# Patient Record
Sex: MALE | Race: WHITE | NOT HISPANIC OR LATINO
[De-identification: names, ages, dates, MRNs, and addresses within clinical notes are randomized per-mention and may not be internally consistent; named-entity substitution may affect disease eponyms.]

---

## 2018-04-15 ENCOUNTER — TRANSCRIPTION ENCOUNTER (OUTPATIENT)
Age: 23
End: 2018-04-15

## 2019-08-19 PROBLEM — Z00.00 ENCOUNTER FOR PREVENTIVE HEALTH EXAMINATION: Status: ACTIVE | Noted: 2019-08-19

## 2019-08-20 ENCOUNTER — APPOINTMENT (OUTPATIENT)
Dept: ULTRASOUND IMAGING | Facility: CLINIC | Age: 24
End: 2019-08-20
Payer: COMMERCIAL

## 2019-08-20 ENCOUNTER — APPOINTMENT (OUTPATIENT)
Dept: RADIOLOGY | Facility: CLINIC | Age: 24
End: 2019-08-20
Payer: COMMERCIAL

## 2019-08-20 ENCOUNTER — OUTPATIENT (OUTPATIENT)
Dept: OUTPATIENT SERVICES | Facility: HOSPITAL | Age: 24
LOS: 1 days | End: 2019-08-20
Payer: COMMERCIAL

## 2019-08-20 DIAGNOSIS — Z00.8 ENCOUNTER FOR OTHER GENERAL EXAMINATION: ICD-10-CM

## 2019-08-20 PROCEDURE — 76770 US EXAM ABDO BACK WALL COMP: CPT | Mod: 26

## 2019-08-20 PROCEDURE — 74018 RADEX ABDOMEN 1 VIEW: CPT

## 2019-08-20 PROCEDURE — 74018 RADEX ABDOMEN 1 VIEW: CPT | Mod: 26

## 2019-08-20 PROCEDURE — 76770 US EXAM ABDO BACK WALL COMP: CPT

## 2020-12-31 ENCOUNTER — TRANSCRIPTION ENCOUNTER (OUTPATIENT)
Age: 25
End: 2020-12-31

## 2021-02-24 ENCOUNTER — TRANSCRIPTION ENCOUNTER (OUTPATIENT)
Age: 26
End: 2021-02-24

## 2021-07-08 ENCOUNTER — APPOINTMENT (OUTPATIENT)
Dept: RADIOLOGY | Facility: CLINIC | Age: 26
End: 2021-07-08
Payer: COMMERCIAL

## 2021-07-08 ENCOUNTER — OUTPATIENT (OUTPATIENT)
Dept: OUTPATIENT SERVICES | Facility: HOSPITAL | Age: 26
LOS: 1 days | End: 2021-07-08
Payer: COMMERCIAL

## 2021-07-08 DIAGNOSIS — Z00.8 ENCOUNTER FOR OTHER GENERAL EXAMINATION: ICD-10-CM

## 2021-07-08 PROCEDURE — 72070 X-RAY EXAM THORAC SPINE 2VWS: CPT | Mod: 26

## 2021-07-08 PROCEDURE — 72100 X-RAY EXAM L-S SPINE 2/3 VWS: CPT | Mod: 26

## 2021-07-08 PROCEDURE — 72070 X-RAY EXAM THORAC SPINE 2VWS: CPT

## 2021-07-08 PROCEDURE — 72040 X-RAY EXAM NECK SPINE 2-3 VW: CPT | Mod: 26

## 2021-07-08 PROCEDURE — 72040 X-RAY EXAM NECK SPINE 2-3 VW: CPT

## 2021-07-08 PROCEDURE — 72100 X-RAY EXAM L-S SPINE 2/3 VWS: CPT

## 2021-09-09 ENCOUNTER — TRANSCRIPTION ENCOUNTER (OUTPATIENT)
Age: 26
End: 2021-09-09

## 2021-11-29 ENCOUNTER — TRANSCRIPTION ENCOUNTER (OUTPATIENT)
Age: 26
End: 2021-11-29

## 2021-11-29 ENCOUNTER — APPOINTMENT (OUTPATIENT)
Dept: PSYCHIATRY | Facility: CLINIC | Age: 26
End: 2021-11-29
Payer: COMMERCIAL

## 2021-11-29 PROCEDURE — 90791 PSYCH DIAGNOSTIC EVALUATION: CPT | Mod: 95

## 2021-12-01 ENCOUNTER — TRANSCRIPTION ENCOUNTER (OUTPATIENT)
Age: 26
End: 2021-12-01

## 2021-12-13 ENCOUNTER — APPOINTMENT (OUTPATIENT)
Dept: PSYCHIATRY | Facility: CLINIC | Age: 26
End: 2021-12-13
Payer: COMMERCIAL

## 2021-12-13 PROCEDURE — ZZZZZ: CPT

## 2022-01-10 ENCOUNTER — APPOINTMENT (OUTPATIENT)
Dept: PSYCHIATRY | Facility: CLINIC | Age: 27
End: 2022-01-10
Payer: COMMERCIAL

## 2022-01-10 PROCEDURE — 96136 PSYCL/NRPSYC TST PHY/QHP 1ST: CPT | Mod: 95

## 2022-01-10 PROCEDURE — 96132 NRPSYC TST EVAL PHYS/QHP 1ST: CPT | Mod: 95

## 2022-01-10 PROCEDURE — 96137 PSYCL/NRPSYC TST PHY/QHP EA: CPT | Mod: 95

## 2022-01-10 PROCEDURE — 96133 NRPSYC TST EVAL PHYS/QHP EA: CPT | Mod: 95

## 2022-02-11 ENCOUNTER — APPOINTMENT (OUTPATIENT)
Dept: PSYCHIATRY | Facility: CLINIC | Age: 27
End: 2022-02-11

## 2022-03-14 ENCOUNTER — EMERGENCY (EMERGENCY)
Facility: HOSPITAL | Age: 27
LOS: 1 days | Discharge: HOME | End: 2022-03-14
Attending: EMERGENCY MEDICINE | Admitting: EMERGENCY MEDICINE
Payer: COMMERCIAL

## 2022-03-14 VITALS
RESPIRATION RATE: 15 BRPM | OXYGEN SATURATION: 95 % | HEIGHT: 73 IN | TEMPERATURE: 98 F | DIASTOLIC BLOOD PRESSURE: 77 MMHG | WEIGHT: 209.66 LBS | HEART RATE: 82 BPM | SYSTOLIC BLOOD PRESSURE: 116 MMHG

## 2022-03-14 PROCEDURE — 10060 I&D ABSCESS SIMPLE/SINGLE: CPT

## 2022-03-14 PROCEDURE — 99283 EMERGENCY DEPT VISIT LOW MDM: CPT | Mod: 25

## 2022-03-14 RX ADMIN — Medication 100 MILLIGRAM(S): at 22:43

## 2022-03-14 NOTE — ED PROVIDER NOTE - CARE PROVIDER_API CALL
Glenn Bauman (DPM)  Podiatric Medicine and Surgery  [address] [place] [address]  Phone: [phone]  Fax: [phone]  Follow Up Time: 4-6 Days

## 2022-03-14 NOTE — ED PROVIDER NOTE - OBJECTIVE STATEMENT
left 1st toe swollen/red/painful, cuts nails down as far as possible, has had ingrown nails, never removed before, no fever/chills, wears shoes without socks generally left 1st toe swollen/red/painful, cuts nails down as far as possible - last time 3d ago, has had ingrown nails in the past, never removed before, no fever/chills, wears shoes without socks generally

## 2022-03-14 NOTE — ED PROVIDER NOTE - PATIENT PORTAL LINK FT
You can access the FollowMyHealth Patient Portal offered by [facility] by registering at the following website: [email or web address]. By joining [facility]’s FollowMyHealth portal, you will also be able to view your health information using other applications (apps) compatible with our system.

## 2022-03-20 ENCOUNTER — TRANSCRIPTION ENCOUNTER (OUTPATIENT)
Age: 27
End: 2022-03-20

## 2022-05-23 ENCOUNTER — NON-APPOINTMENT (OUTPATIENT)
Age: 27
End: 2022-05-23

## 2022-07-14 ENCOUNTER — NON-APPOINTMENT (OUTPATIENT)
Age: 27
End: 2022-07-14

## 2022-09-02 ENCOUNTER — APPOINTMENT (OUTPATIENT)
Dept: PULMONOLOGY | Facility: CLINIC | Age: 27
End: 2022-09-02

## 2022-09-02 ENCOUNTER — LABORATORY RESULT (OUTPATIENT)
Age: 27
End: 2022-09-02

## 2022-09-02 VITALS
BODY MASS INDEX: 26.51 KG/M2 | RESPIRATION RATE: 16 BRPM | HEART RATE: 70 BPM | OXYGEN SATURATION: 97 % | DIASTOLIC BLOOD PRESSURE: 65 MMHG | HEIGHT: 73 IN | SYSTOLIC BLOOD PRESSURE: 110 MMHG | TEMPERATURE: 97.6 F | WEIGHT: 200 LBS

## 2022-09-02 DIAGNOSIS — Z86.59 PERSONAL HISTORY OF OTHER MENTAL AND BEHAVIORAL DISORDERS: ICD-10-CM

## 2022-09-02 DIAGNOSIS — Z82.69 FAMILY HISTORY OF OTHER DISEASES OF THE MUSCULOSKELETAL SYSTEM AND CONNECTIVE TISSUE: ICD-10-CM

## 2022-09-02 DIAGNOSIS — U07.1 COVID-19: ICD-10-CM

## 2022-09-02 DIAGNOSIS — Z82.5 FAMILY HISTORY OF ASTHMA AND OTHER CHRONIC LOWER RESPIRATORY DISEASES: ICD-10-CM

## 2022-09-02 DIAGNOSIS — Z78.9 OTHER SPECIFIED HEALTH STATUS: ICD-10-CM

## 2022-09-02 DIAGNOSIS — Z87.09 PERSONAL HISTORY OF OTHER DISEASES OF THE RESPIRATORY SYSTEM: ICD-10-CM

## 2022-09-02 DIAGNOSIS — Z88.9 ALLERGY STATUS TO UNSPECIFIED DRUGS, MEDICAMENTS AND BIOLOGICAL SUBSTANCES: ICD-10-CM

## 2022-09-02 LAB
25(OH)D3 SERPL-MCNC: 40.1 NG/ML
A1AT SERPL-MCNC: 123 MG/DL
BASOPHILS # BLD AUTO: 0.04 K/UL
BASOPHILS NFR BLD AUTO: 0.7 %
EOSINOPHIL # BLD AUTO: 0.37 K/UL
EOSINOPHIL NFR BLD AUTO: 6.8 %
HCT VFR BLD CALC: 48.2 %
HGB BLD-MCNC: 16 G/DL
IMM GRANULOCYTES NFR BLD AUTO: 0.2 %
LYMPHOCYTES # BLD AUTO: 2.4 K/UL
LYMPHOCYTES NFR BLD AUTO: 44 %
MAN DIFF?: NORMAL
MCHC RBC-ENTMCNC: 30.4 PG
MCHC RBC-ENTMCNC: 33.2 GM/DL
MCV RBC AUTO: 91.6 FL
MONOCYTES # BLD AUTO: 0.4 K/UL
MONOCYTES NFR BLD AUTO: 7.3 %
NEUTROPHILS # BLD AUTO: 2.24 K/UL
NEUTROPHILS NFR BLD AUTO: 41 %
PLATELET # BLD AUTO: 257 K/UL
RBC # BLD: 5.26 M/UL
RBC # FLD: 12.1 %
WBC # FLD AUTO: 5.46 K/UL

## 2022-09-02 PROCEDURE — 94010 BREATHING CAPACITY TEST: CPT

## 2022-09-02 PROCEDURE — 94727 GAS DIL/WSHOT DETER LNG VOL: CPT

## 2022-09-02 PROCEDURE — 95012 NITRIC OXIDE EXP GAS DETER: CPT

## 2022-09-02 PROCEDURE — 94618 PULMONARY STRESS TESTING: CPT

## 2022-09-02 PROCEDURE — 94729 DIFFUSING CAPACITY: CPT

## 2022-09-02 PROCEDURE — 71046 X-RAY EXAM CHEST 2 VIEWS: CPT

## 2022-09-02 PROCEDURE — 99204 OFFICE O/P NEW MOD 45 MIN: CPT | Mod: 25

## 2022-09-02 RX ORDER — EPINEPHRINE 0.3MG/0.3
0.3 AUTO-INJECTOR (EA) INJECTION
Refills: 0 | Status: ACTIVE | COMMUNITY

## 2022-09-02 RX ORDER — LEVOCETIRIZINE DIHYDROCHLORIDE 5 MG/1
5 TABLET ORAL
Refills: 0 | Status: ACTIVE | COMMUNITY

## 2022-09-02 RX ORDER — VENLAFAXINE HCL 50 MG
TABLET ORAL
Refills: 0 | Status: ACTIVE | COMMUNITY

## 2022-09-02 NOTE — REASON FOR VISIT
[Initial] : an initial visit [TextBox_44] : SOB, mild and intermittent asthma, allergies / urticaria, ?OSAS

## 2022-09-02 NOTE — ASSESSMENT
[FreeTextEntry1] : Mr. ESCAMILLA  is a 26 year old male with a history of OCD, depression, asthma, allergies, urticaria, s/p COVID-19 6/2022 who now comes to the office for an initial pulmonary evaluation for SOB, mild and intermittent asthma, allergies, urticaria, ?OSAS \par \par His shortness of breath is multifactorial due to:\par -poor mechanics of breathing \par -out of shape / over weight \par - pulmonary disease\par    - mild and intermittent asthma \par -?cardiac disease (doubt)\par \par problem 1: Mild and intermittent asthma\par -Add Symbicort 160 2 inhalations BID \par -Add Ventolin 2 puffs Q6H, pre-exercise \par - blood work: alpha-1 antitrypsin level\par  - Asthma is believed to be caused by inherited (genetic) and environmental factor, but its exact cause is unknown. Asthma may be triggered by allergens, lung infections, or irritants in the air. Asthma triggers are different for each person.\par -Inhaler technique reviewed as well as oral hygiene techniques reviewed with patient. Avoidance of cold air, extremes of temperature, rescue inhaler should be used before exercise. Order of medication reviewed with patient. Recommended use of a cool mist humidifier in the bedroom.\par \par \par problem 2: Allergies / Urticaria \par -add Olopatadine 0.6% 1 sniff BID \par -continue Xyzal 5 mg QHS \par - gets allergy shots \par - script given for blood work: asthma profile, food IgE panel, eosinophil level, IgE level, Vitamin D level\par - Environmental measures for allergies were encouraged including mattress and pillow cover, air purifier, and environmental controls.\par \par \par Problem 3: ?OSAS (risk factors: snoring, neck size 16 inches, high mallampati class) \par - get HSS \par -Sleep apnea is associated with adverse clinical consequences which can affect most organ systems.  Cardiovascular disease risk includes arrhythmias, atrial fibrillation, hypertension, coronary artery disease, and stroke. Metabolic disorders include diabetes type 2, non-alcoholic fatty liver disease. Mood disorder especially depression; and cognitive decline especially in the elderly. Associations with  chronic reflux/Bennett’s esophagus some but not all inclusive. \par -Reasons  include arousal consistent with hypopnea; respiratory events most prominent in REM sleep or supine position; therefore sleep staging and body position are important for accurate diagnosis and estimation of AHI.\par \par \par problem 4 : cardiac disease\par -recommended to continue to follow up with Cardiologist \par \par problem 5: poor breathing mechanics\par -recommended Wim Hof and Buteyko breathing techniques \par -Proper breathing techniques were reviewed with an emphasis of exhalation. Patient instructed to breath in for 1 second and out for four seconds. Patient was encouraged to not talk while walking. \par \par problem 6 :  overweight/ out of shape \par - recommended 150 min of exercise a week \par - Recommended "10-Day Detox" by Joe Linn for 2-3 weeks followed by probiotics \par -Weight loss, exercise, and diet control were discussed and are highly encouraged. Treatment options are given such as, aqua therapy, and contacting a nutritionist. Recommended to use the elliptical, stationary bike, less use of treadmill. \par \par Problem 7: s/p COVID-19 6/2022 \par - s/p COVID-19 vaccine x3  \par \par problem 8: health maintenance \par -recommended yearly flu shot after October 15\par -recommended strep pneumonia vaccines: Prevnar-13 vaccine, followed by Pneumo vaccine 23 one year following after 65 years old. \par -recommended early intervention for Upper Respiratory Infections (URIs)\par -recommended regular osteoporosis evaluations\par -recommended early dermatological evaluations\par -recommended after the age of 50 to the age of 70, colonoscopy every 5 years\par \par F/U in 6-8 weeks.\par He is encouraged to call with any changes, concerns, or questions\par
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
## 2022-09-02 NOTE — PROCEDURE
[FreeTextEntry1] : FENO was 10; normal value being less than 25\par Fractional exhaled nitric oxide (FENO) is regarded as a simple, noninvasive method for assessing eosinophilic airway inflammation. Produced by a variety of cells within the lung, nitric oxide (NO) concentrations are generally low in healthy individuals. However, high concentrations of NO appear to be involved in nonspecific host defense mechanisms and chronic inflammatory diseases such as asthma. The American Thoracic Society (ATS) therefore has recommended using FENO to aid in the diagnosis and monitoring of eosinophilic airway inflammation and asthma, and for identifying steroid responsive individuals whose chronic respiratory symptoms may be airway inflammation. \par \par 6 minute walk test reveals a low saturation of 97% with no evidence of dyspnea or fatigue; walked  489.0 meters \par \par CXR reveals normal sized heart; relative vol difference in lungs, right lung has lessvol than left ; mild scoliosis to the right \par \par FULL PFTs reveals very mild obstructive flows; FEV1 was  4.50L which is 90% of predicted; mid to low lung volumes; normal diffusion of 26.9, which is 78% of predicted; poor inspiratory limb

## 2022-09-02 NOTE — ADDENDUM
[FreeTextEntry1] : Documented by Uzma Grande acting as a scribe for Dr. Shawn Alves on 09/02/2022 \par \par All medical record entries made by the Scribe were at my, Dr. Shawn Alves's, direction and personally dictated by me on 09/02/2022 . I have reviewed the chart and agree that the record accurately reflects my personal performance of the history, physical exam, assessment and plan. I have also personally directed, reviewed, and agree with the discharge instructions.

## 2022-09-03 LAB — 24R-OH-CALCIDIOL SERPL-MCNC: 44.6 PG/ML

## 2022-09-05 ENCOUNTER — NON-APPOINTMENT (OUTPATIENT)
Age: 27
End: 2022-09-05

## 2022-09-06 LAB
A1AT PHENOTYP SERPL-IMP: NORMAL
A1AT SERPL-MCNC: 123 MG/DL

## 2022-09-07 ENCOUNTER — NON-APPOINTMENT (OUTPATIENT)
Age: 27
End: 2022-09-07

## 2022-09-07 LAB
DEPRECATED GOOSE FEATHER IGE RAST QL: 0
GOOSE FEATHER IGE QN: <0.1 KUA/L
TOTAL IGE SMQN RAST: 124 KU/L

## 2022-09-08 LAB
A ALTERNATA IGE QN: <0.1 KUA/L
A ALTERNATA IGE QN: <0.1 KUA/L
A FUMIGATUS IGE QN: <0.1 KUA/L
A FUMIGATUS IGE QN: <0.1 KUA/L
BERMUDA GRASS IGE QN: <0.1 KUA/L
BOXELDER IGE QN: 0.26 KUA/L
C ALBICANS IGE QN: <0.1 KUA/L
C HERBARUM IGE QN: <0.1 KUA/L
C HERBARUM IGE QN: <0.1 KUA/L
CALIF WALNUT IGE QN: 0.26 KUA/L
CAT DANDER IGE QN: 0.39 KUA/L
CAT DANDER IGE QN: 0.39 KUA/L
CLAM IGE QN: <0.1 KUA/L
CMN PIGWEED IGE QN: <0.1 KUA/L
CODFISH IGE QN: <0.1 KUA/L
COMMON RAGWEED IGE QN: 0.12 KUA/L
COMMON RAGWEED IGE QN: 0.12 KUA/L
CORN IGE QN: <0.1 KUA/L
COTTONWOOD IGE QN: 0.1 KUA/L
COW MILK IGE QN: <0.1 KUA/L
D FARINAE IGE QN: 10.1 KUA/L
D FARINAE IGE QN: 10.1 KUA/L
D PTERONYSS IGE QN: 5.04 KUA/L
D PTERONYSS IGE QN: 5.04 KUA/L
DEPRECATED A ALTERNATA IGE RAST QL: 0
DEPRECATED A ALTERNATA IGE RAST QL: 0
DEPRECATED A FUMIGATUS IGE RAST QL: 0
DEPRECATED A FUMIGATUS IGE RAST QL: 0
DEPRECATED BERMUDA GRASS IGE RAST QL: 0
DEPRECATED BOXELDER IGE RAST QL: NORMAL
DEPRECATED C ALBICANS IGE RAST QL: 0
DEPRECATED C HERBARUM IGE RAST QL: 0
DEPRECATED C HERBARUM IGE RAST QL: 0
DEPRECATED CAT DANDER IGE RAST QL: 1
DEPRECATED CAT DANDER IGE RAST QL: 1
DEPRECATED CLAM IGE RAST QL: 0
DEPRECATED CODFISH IGE RAST QL: 0
DEPRECATED COMMON PIGWEED IGE RAST QL: 0
DEPRECATED COMMON RAGWEED IGE RAST QL: NORMAL
DEPRECATED COMMON RAGWEED IGE RAST QL: NORMAL
DEPRECATED CORN IGE RAST QL: 0
DEPRECATED COTTONWOOD IGE RAST QL: NORMAL
DEPRECATED COW MILK IGE RAST QL: 0
DEPRECATED D FARINAE IGE RAST QL: 3
DEPRECATED D FARINAE IGE RAST QL: 3
DEPRECATED D PTERONYSS IGE RAST QL: 3
DEPRECATED D PTERONYSS IGE RAST QL: 3
DEPRECATED DOG DANDER IGE RAST QL: 2
DEPRECATED DOG DANDER IGE RAST QL: 2
DEPRECATED DUCK FEATHER IGE RAST QL: 0
DEPRECATED EGG WHITE IGE RAST QL: 0
DEPRECATED GOOSEFOOT IGE RAST QL: 0
DEPRECATED LONDON PLANE IGE RAST QL: NORMAL
DEPRECATED M RACEMOSUS IGE RAST QL: 0
DEPRECATED MOUSE URINE PROT IGE RAST QL: 0
DEPRECATED MUGWORT IGE RAST QL: 0
DEPRECATED P NOTATUM IGE RAST QL: 0
DEPRECATED PEANUT IGE RAST QL: NORMAL
DEPRECATED RED CEDAR IGE RAST QL: NORMAL
DEPRECATED ROACH IGE RAST QL: NORMAL
DEPRECATED ROACH IGE RAST QL: NORMAL
DEPRECATED SCALLOP IGE RAST QL: <0.1 KUA/L
DEPRECATED SESAME SEED IGE RAST QL: NORMAL
DEPRECATED SHEEP SORREL IGE RAST QL: 0
DEPRECATED SHRIMP IGE RAST QL: 0
DEPRECATED SILVER BIRCH IGE RAST QL: 2
DEPRECATED SOYBEAN IGE RAST QL: 0
DEPRECATED TIMOTHY IGE RAST QL: 2
DEPRECATED TIMOTHY IGE RAST QL: 2
DEPRECATED WALNUT IGE RAST QL: 0
DEPRECATED WHEAT IGE RAST QL: NORMAL
DEPRECATED WHITE ASH IGE RAST QL: 1
DEPRECATED WHITE OAK IGE RAST QL: 3
DEPRECATED WHITE OAK IGE RAST QL: 3
DOG DANDER IGE QN: 1.44 KUA/L
DOG DANDER IGE QN: 1.44 KUA/L
DUCK FEATHER IGE QN: <0.1 KUA/L
EGG WHITE IGE QN: <0.1 KUA/L
GOOSEFOOT IGE QN: <0.1 KUA/L
LONDON PLANE IGE QN: 0.11 KUA/L
M RACEMOSUS IGE QN: <0.1 KUA/L
MOUSE URINE PROT IGE QN: <0.1 KUA/L
MUGWORT IGE QN: <0.1 KUA/L
MULBERRY (T70) CLASS: 0
MULBERRY (T70) CONC: <0.1 KUA/L
P NOTATUM IGE QN: <0.1 KUA/L
PEANUT IGE QN: 0.18 KUA/L
RED CEDAR IGE QN: 0.11 KUA/L
ROACH IGE QN: 0.12 KUA/L
ROACH IGE QN: 0.12 KUA/L
SCALLOP IGE QN: 0
SCALLOP IGE QN: <0.1 KUA/L
SESAME SEED IGE QN: 0.25 KUA/L
SHEEP SORREL IGE QN: <0.1 KUA/L
SILVER BIRCH IGE QN: 1.11 KUA/L
SOYBEAN IGE QN: <0.1 KUA/L
TIMOTHY IGE QN: 0.91 KUA/L
TIMOTHY IGE QN: 0.91 KUA/L
TREE ALLERG MIX1 IGE QL: NORMAL
WALNUT IGE QN: <0.1 KUA/L
WHEAT IGE QN: 0.14 KUA/L
WHITE ASH IGE QN: 0.35 KUA/L
WHITE ELM IGE QN: 0.26 KUA/L
WHITE ELM IGE QN: NORMAL
WHITE OAK IGE QN: 6.69 KUA/L
WHITE OAK IGE QN: 6.69 KUA/L

## 2022-09-09 LAB
ANNOTATION COMMENT IMP: NORMAL
ELECTRONIC SIGNATURE: NORMAL
SERPINA1 GENE MUT TESTED BLD/T: NORMAL

## 2022-09-11 ENCOUNTER — EMERGENCY (EMERGENCY)
Facility: HOSPITAL | Age: 27
LOS: 1 days | Discharge: HOME | End: 2022-09-11
Attending: EMERGENCY MEDICINE | Admitting: EMERGENCY MEDICINE
Payer: COMMERCIAL

## 2022-09-11 VITALS
OXYGEN SATURATION: 98 % | HEIGHT: 73 IN | SYSTOLIC BLOOD PRESSURE: 130 MMHG | TEMPERATURE: 98 F | RESPIRATION RATE: 15 BRPM | HEART RATE: 91 BPM | WEIGHT: 196.21 LBS | DIASTOLIC BLOOD PRESSURE: 89 MMHG

## 2022-09-11 PROCEDURE — 99284 EMERGENCY DEPT VISIT MOD MDM: CPT

## 2022-09-11 PROCEDURE — 99282 EMERGENCY DEPT VISIT SF MDM: CPT

## 2022-09-11 NOTE — ED PROVIDER NOTE - OBJECTIVE STATEMENT
Patient complaining of dog bite to right forearm and left shin from a known dog.  Patient relates the dog belongs to his friend's girlfriend, he is not sure of the dog's rabies vaccine status however he will be contacting his friend to find out, will contact animal control as needed.  Patient's tetanus up-to-date.  Patient is currently on a course of Augmentin for an infection to his finger. no fevers chills or nay other complaints.  pmd - [facility]

## 2022-09-11 NOTE — ED PROVIDER NOTE - CARE PROVIDER_API CALL
Mehrdad Duenas; MBBS)  Internal Medicine  [address] [place] [address]  Phone: [phone]  Fax: [phone]  Follow Up Time: 1-3 Days

## 2022-09-11 NOTE — ED PROVIDER NOTE - NSFOLLOWUPINSTRUCTIONS_ED_ALL_ED_FT
continue augmentin as previously  follow up with animal control and dog's owner to assess dog vaccine status, and return for rabies vaccination if needed per animal control

## 2022-09-11 NOTE — ED PROVIDER NOTE - PATIENT PORTAL LINK FT
You can access the FollowMyHealth Patient Portal offered by [facility] by registering at the following website: [email or web address]. By joining [facility]’s FollowMyHealth portal, you will also be able to view your health information using other applications (apps) compatible with our system.

## 2022-09-11 NOTE — ED PROVIDER NOTE - CLINICAL SUMMARY MEDICAL DECISION MAKING FREE TEXT BOX
Patient complaining of dog bite to right forearm and left shin from a known dog.  Patient relates the dog belongs to his friend's girlfriend, patient is not sure of the dog's rabies vaccine status however he will be contacting owner to find out, will contact animal control as needed.  Patient's tetanus up-to-date.  Patient is currently on a course of Augmentin for an infection to his finger.  Plan wound care, outpatient follow-up

## 2022-09-11 NOTE — ED ADULT NURSE NOTE - OBJECTIVE STATEMENT
patient likes to check his wound, he was bitten by a dog with unknown rabies shot yesterday, patient is already on antibiotics, denies fever or chills, n/v, MD at bedside, will continue to monitor.

## 2022-09-11 NOTE — ED PROVIDER NOTE - SKIN, MLM
Skin normal color for race, warm, dry. abrasions right forearm and left shin at bite sites, no bleeding no erythema

## 2022-10-20 ENCOUNTER — APPOINTMENT (OUTPATIENT)
Dept: SLEEP CENTER | Facility: CLINIC | Age: 27
End: 2022-10-20

## 2022-10-20 ENCOUNTER — FORM ENCOUNTER (OUTPATIENT)
Age: 27
End: 2022-10-20

## 2022-10-20 PROCEDURE — ZZZZZ: CPT

## 2022-10-21 ENCOUNTER — APPOINTMENT (OUTPATIENT)
Dept: SLEEP CENTER | Facility: CLINIC | Age: 27
End: 2022-10-21

## 2022-10-21 ENCOUNTER — OUTPATIENT (OUTPATIENT)
Dept: OUTPATIENT SERVICES | Facility: HOSPITAL | Age: 27
LOS: 1 days | End: 2022-10-21
Payer: COMMERCIAL

## 2022-10-21 PROCEDURE — 95806 SLEEP STUDY UNATT&RESP EFFT: CPT | Mod: 26

## 2022-10-21 PROCEDURE — 95806 SLEEP STUDY UNATT&RESP EFFT: CPT

## 2022-10-26 ENCOUNTER — NON-APPOINTMENT (OUTPATIENT)
Age: 27
End: 2022-10-26

## 2022-11-01 ENCOUNTER — APPOINTMENT (OUTPATIENT)
Dept: SURGERY | Facility: CLINIC | Age: 27
End: 2022-11-01

## 2022-11-01 ENCOUNTER — NON-APPOINTMENT (OUTPATIENT)
Age: 27
End: 2022-11-01

## 2022-11-01 VITALS
WEIGHT: 197 LBS | DIASTOLIC BLOOD PRESSURE: 70 MMHG | HEART RATE: 90 BPM | SYSTOLIC BLOOD PRESSURE: 110 MMHG | HEIGHT: 73 IN | TEMPERATURE: 97.4 F | OXYGEN SATURATION: 96 % | BODY MASS INDEX: 26.11 KG/M2

## 2022-11-01 PROCEDURE — 99203 OFFICE O/P NEW LOW 30 MIN: CPT

## 2022-11-01 RX ORDER — ALBUTEROL SULFATE 90 UG/1
108 (90 BASE) AEROSOL, METERED RESPIRATORY (INHALATION)
Refills: 0 | Status: DISCONTINUED | COMMUNITY
End: 2022-11-01

## 2022-11-01 RX ORDER — OLOPATADINE HYDROCHLORIDE 665 UG/1
0.6 SPRAY, METERED NASAL
Qty: 1 | Refills: 3 | Status: DISCONTINUED | COMMUNITY
Start: 2022-09-02 | End: 2022-11-01

## 2022-11-02 ENCOUNTER — OUTPATIENT (OUTPATIENT)
Dept: OUTPATIENT SERVICES | Facility: HOSPITAL | Age: 27
LOS: 1 days | End: 2022-11-02

## 2022-11-02 DIAGNOSIS — Z00.8 ENCOUNTER FOR OTHER GENERAL EXAMINATION: ICD-10-CM

## 2022-11-03 NOTE — PLAN
[FreeTextEntry1] : Persistent but minimally symptomatic soft tissue lesion of skin/ SQ of right forearm.\par History suggestive of cystic lesion/ collection.  Exam today benign.\par Reassurance provided for the present.\par Small size goes against aspiration and no fluctuance to mandate drainage.\par Recommending observation and local care for present.\par Daily warm water and soap wash with H202 rinse.\par Will arrange for dedicated soft tissue ultrasound to further delineate anatomy and rule out concerning pathology.\par If progresses or more symptomatic, then elective excision can be discussed further; R/B/N reviewed preliminarily.\par Mr. Wiley is pleased, his mother agrees, they leave in good spirits.  I await sonography report.\par \par Please note that more than 50% of face to face time was spent in counseling and coordination of care.

## 2022-11-03 NOTE — HISTORY OF PRESENT ILLNESS
[de-identified] : Very pleasant though admittedly anxious young man presenting to the office today in the company of his mother.\par They describe a visible/ palpable discrete lesion or small inflammatory collection/ cyst/ lesion along the posterior aspect of his right arm since ~ July.\par Mr. Wiley believes that it may be secondary to "an insect bite."\par However, he denies kyle discomfort/ pain or any odor/ drainage.\par He admits that his main concern is that "I don't know what it is... and it hasn't really gone away... sometimes it is better... sometimes it is worse..."

## 2022-11-03 NOTE — REVIEW OF SYSTEMS
[As Noted in HPI] : as noted in HPI [Anxiety] : anxiety [Negative] : Heme/Lymph [Feeling Poorly] : not feeling poorly [Feeling Tired] : not feeling tired [Depression] : no depression [de-identified] : regarding this issue and visit...

## 2022-11-03 NOTE — PHYSICAL EXAM
[Respiratory Effort] : normal respiratory effort [Normal Rate and Rhythm] : normal rate and rhythm [No HSM] : no hepatosplenomegaly [Alert] : alert [Oriented to Person] : oriented to person [Oriented to Place] : oriented to place [Oriented to Time] : oriented to time [Anxious] : anxious [Tender] : was nontender [Enlarged] : not enlarged [de-identified] : Appears well, no acute distress, ambulates easily into office and assumes examination table without need of assistance.  [de-identified] : Normocephalic and atraumatic.  [de-identified] : Supple with full range of motion.  [FreeTextEntry1] : No cervical, supraclavicular or axillary adenopathy.  [de-identified] : Deferred.  [de-identified] : Soft and non tense and non tender. [de-identified] : Deferred.  [de-identified] : Deferred.  [de-identified] : Grossly symmetric and within normal limits without motor or sensory deficits.  [de-identified] : Soft, rubbery, semi-mobile soft tissue lesion ~ 1 cm in size in deep dermal layer or superficial SQ of dorsal aspect of proximal right forearm.  No visible cellulitis.  No palpable fluctuance.  Non tender to manipulation today. [de-identified] : though appropriately so...

## 2022-11-03 NOTE — REASON FOR VISIT
[Initial Evaluation] : an initial evaluation [FreeTextEntry1] : regarding "a bump on my right forearm..."

## 2022-11-04 ENCOUNTER — NON-APPOINTMENT (OUTPATIENT)
Age: 27
End: 2022-11-04

## 2022-11-04 ENCOUNTER — APPOINTMENT (OUTPATIENT)
Dept: PULMONOLOGY | Facility: CLINIC | Age: 27
End: 2022-11-04

## 2022-11-04 VITALS
HEIGHT: 73 IN | HEART RATE: 80 BPM | BODY MASS INDEX: 25.84 KG/M2 | SYSTOLIC BLOOD PRESSURE: 124 MMHG | RESPIRATION RATE: 16 BRPM | WEIGHT: 195 LBS | DIASTOLIC BLOOD PRESSURE: 68 MMHG | TEMPERATURE: 97.3 F | OXYGEN SATURATION: 97 %

## 2022-11-04 DIAGNOSIS — Z92.29 PERSONAL HISTORY OF OTHER DRUG THERAPY: ICD-10-CM

## 2022-11-04 PROCEDURE — 94010 BREATHING CAPACITY TEST: CPT

## 2022-11-04 PROCEDURE — 95012 NITRIC OXIDE EXP GAS DETER: CPT

## 2022-11-04 PROCEDURE — 99214 OFFICE O/P EST MOD 30 MIN: CPT | Mod: 25

## 2022-11-04 NOTE — REASON FOR VISIT
[Follow-Up] : a follow-up visit [TextBox_44] : SOB, mild and intermittent asthma, allergies / urticaria

## 2022-11-04 NOTE — ADDENDUM
[FreeTextEntry1] : Documented by Fede Figueroa acting as a scribe for Dr. Shawn Alves on (11/04/2022).\par \par All medical record entries made by the Scribe were at my, Dr. Shawn Alves's, direction and personally dictated by me on (11/04/2022). I have reviewed the chart and agree that the record accurately reflects my personal performance of the history, physical exam, assessment and plan. I have personally directed, reviewed, and agree with the discharge instructions.

## 2022-11-04 NOTE — HISTORY OF PRESENT ILLNESS
[TextBox_4] : Mr. ESCAMILLA is a 26 year old male presenting to the office today for follow up pulmonary evaluation. His chief complaint is\par - he notes feeling well in general \par - he denies difficulty swallowing\par - he notes not getting great sleep, 6-7 hours, not completely restful\par - he notes bowels are regular \par - he denies taking any new medications, vitamins, or supplements \par - he notes walking \par \par - He denies any headaches, nausea, vomiting, fever, chills, sweats, chest pain, chest pressure, palpitations, SOB, coughing, wheezing, fatigue, diarrhea, constipation, dysphagia, myalgias, dizziness, leg swelling, leg pain, itchy eyes, itchy ears, heartburn, reflux, or sour taste in the mouth

## 2022-11-04 NOTE — PROCEDURE
[FreeTextEntry1] : PFT reveals normal flows, with an FEV1 of  4.72L, which is 97% of predicted, with a faint inspiratory limb \par \par FENO was 8; a normal value being less than 25\par Fractional exhaled nitric oxide (FENO) is regarded as a simple, noninvasive method for assessing eosinophilic airway inflammation. Produced by a variety of cells within the lung, nitric oxide (NO) concentrations are generally low in healthy individuals. However, high concentrations of NO appear to be involved in nonspecific host defense mechanisms and chronic inflammatory diseases such as asthma. The American Thoracic Society (ATS) therefore has recommended using FENO to aid in the diagnosis and monitoring of eosinophilic airway inflammation and asthma, and for identifying steroid responsive individuals whose chronic respiratory symptoms may be caused by airway inflammation. \par \par Sleep study (10/21/2022) revealed sleep apnea with an AHI/RONI of 1.7

## 2022-11-04 NOTE — ASSESSMENT
[FreeTextEntry1] : Mr. ESCAMILLA  is a 26 year old male with a history of OCD, depression, asthma, allergies, urticaria, s/p COVID-19 6/2022 who now comes to the office for a follow up pulmonary evaluation for SOB, mild and intermittent asthma, allergies, urticaria, elevated IgE/eosinophils \par \par His shortness of breath is multifactorial due to:\par -poor mechanics of breathing \par -out of shape / over weight \par - pulmonary disease\par    - mild and intermittent asthma \par -?cardiac disease (doubt)\par \par problem 1: Mild and intermittent asthma\par -Symbicort 160 2 inhalations BID \par -Ventolin 2 puffs Q6H, pre-exercise \par - s/p blood work: alpha-1 antitrypsin level WNL\par  - Asthma is believed to be caused by inherited (genetic) and environmental factor, but its exact cause is unknown. Asthma may be triggered by allergens, lung infections, or irritants in the air. Asthma triggers are different for each person.\par -Inhaler technique reviewed as well as oral hygiene techniques reviewed with patient. Avoidance of cold air, extremes of temperature, rescue inhaler should be used before exercise. Order of medication reviewed with patient. Recommended use of a cool mist humidifier in the bedroom.\par \par problem 1A: Eleavted Ige\par -Xolair is a recombinant DNA- derived humanized IgG1K monoclonal antibody that selectively binds ot human immunoglobulin E (IgE). Xolair is produced by a Chinese hamster ovary cell suspension culture in nutrient medium containing the antibiotic gentamicin. Gentamicin is not detectable in the final product. Xolair is a sterile, white, preservative free, lyophilized powder contained in a single use vial that is reconstituted with sterile water for suspension. Side effects include: wheezing, tightness of the chest, trouble breathing, hives, skin rash, feeling anxious or light-headed, fainting, warmth or tingling under skin, or swelling of face, lips, or tongue \par \par problem 1B: Eosinophilic asthma\par - The safety and efficacy of Nucala was established in three double-blind, randomized, placebo-controlled trials in patients with severe asthma. Compared to a placebo, patients with severe asthma receiving Nucala had fewer exacerbation requiring hospitalization and/or emergency department visits, and a longer time to first exacerbation. In addition, patients with severe asthma receiving Nucala or Fasenra experienced greater reductions in their daily maintenance oral corticosteroid dose, while maintaining asthma control compared with patients receiving placebo. Treatment with Nucala did not result in a significant improvement in lung function, as measured by the volume of air exhaled by patients in one second. The most common side effects include: headache, injection site reactions, back pain, weakness, and fatigue; hypersensitivity reactions can occur within hours or days including swelling of the face, mouth, and tongue, fainting, dizziness, hives, breathing problems, and rash; herpes zoster infections have occurred. The drug is a monoclonal antibody that inhibits interleukin-5 which helps regular eosinophils, a type of white blood cell that contributes to asthma. The over-production of eosinophils can cause inflammation in the lungs, increasing the frequency of asthma attacks. Patients must also take other medications, including high dose inhaled corticosteroids and at least one additional asthma drug. \par \par problem 2: Allergies / Urticaria \par -add Olopatadine 0.6% 1 sniff BID \par -continue Xyzal 5 mg QHS \par - gets allergy shots \par -s/p  script given for blood work:+ asthma profile, + food IgE panel, + eosinophil level,+ IgE level, Vitamin D level normal \par - Environmental measures for allergies were encouraged including mattress and pillow cover, air purifier, and environmental controls.\par \par \par Problem 3: ?OSAS (risk factors: snoring, neck size 16 inches, high mallampati class) \par - s/p HSS Not present \par -Sleep apnea is associated with adverse clinical consequences which can affect most organ systems.  Cardiovascular disease risk includes arrhythmias, atrial fibrillation, hypertension, coronary artery disease, and stroke. Metabolic disorders include diabetes type 2, non-alcoholic fatty liver disease. Mood disorder especially depression; and cognitive decline especially in the elderly. Associations with  chronic reflux/Bennett’s esophagus some but not all inclusive. \par -Reasons  include arousal consistent with hypopnea; respiratory events most prominent in REM sleep or supine position; therefore sleep staging and body position are important for accurate diagnosis and estimation of AHI.\par \par \par problem 4 : cardiac disease (doubtful) \par -recommended to continue to follow up with Cardiologist \par \par problem 5: poor breathing mechanics\par -recommended Rachel Jorgensenko breathing techniques \par -Proper breathing techniques were reviewed with an emphasis of exhalation. Patient instructed to breath in for 1 second and out for four seconds. Patient was encouraged to not talk while walking. \par \par problem 6 :  overweight/ out of shape \par - recommended 150 min of exercise a week \par - Recommended "10-Day Detox" by Joe Linn for 2-3 weeks followed by probiotics \par -Weight loss, exercise, and diet control were discussed and are highly encouraged. Treatment options are given such as, aqua therapy, and contacting a nutritionist. Recommended to use the elliptical, stationary bike, less use of treadmill. \par \par Problem 7: s/p COVID-19 6/2022 \par - s/p COVID-19 vaccine x3  \par \par problem 8: health maintenance \par -recommended yearly flu shot after October 15 2022\par -recommended strep pneumonia vaccines: Prevnar-13 vaccine, followed by Pneumo vaccine 23 one year following after 65 years old. \par -recommended early intervention for Upper Respiratory Infections (URIs)\par -recommended regular osteoporosis evaluations\par -recommended early dermatological evaluations\par -recommended after the age of 50 to the age of 70, colonoscopy every 5 years\par \par F/U in 6-8 weeks.\par He is encouraged to call with any changes, concerns, or questions\par

## 2022-11-09 ENCOUNTER — APPOINTMENT (OUTPATIENT)
Dept: ULTRASOUND IMAGING | Facility: CLINIC | Age: 27
End: 2022-11-09

## 2022-11-09 ENCOUNTER — NON-APPOINTMENT (OUTPATIENT)
Age: 27
End: 2022-11-09

## 2022-11-09 ENCOUNTER — OUTPATIENT (OUTPATIENT)
Dept: OUTPATIENT SERVICES | Facility: HOSPITAL | Age: 27
LOS: 1 days | End: 2022-11-09
Payer: COMMERCIAL

## 2022-11-09 DIAGNOSIS — Z00.8 ENCOUNTER FOR OTHER GENERAL EXAMINATION: ICD-10-CM

## 2022-11-09 DIAGNOSIS — L72.9 FOLLICULAR CYST OF THE SKIN AND SUBCUTANEOUS TISSUE, UNSPECIFIED: ICD-10-CM

## 2022-11-09 PROCEDURE — 76882 US LMTD JT/FCL EVL NVASC XTR: CPT | Mod: 26,RT

## 2022-11-09 PROCEDURE — 76882 US LMTD JT/FCL EVL NVASC XTR: CPT

## 2022-11-16 ENCOUNTER — APPOINTMENT (OUTPATIENT)
Dept: SURGERY | Facility: CLINIC | Age: 27
End: 2022-11-16

## 2022-11-16 VITALS
HEART RATE: 73 BPM | TEMPERATURE: 97.2 F | DIASTOLIC BLOOD PRESSURE: 70 MMHG | WEIGHT: 195 LBS | OXYGEN SATURATION: 97 % | HEIGHT: 73 IN | BODY MASS INDEX: 25.84 KG/M2 | SYSTOLIC BLOOD PRESSURE: 110 MMHG

## 2022-11-16 DIAGNOSIS — D48.7 NEOPLASM OF UNCERTAIN BEHAVIOR OF OTHER SPECIFIED SITES: ICD-10-CM

## 2022-11-16 DIAGNOSIS — L72.9 FOLLICULAR CYST OF THE SKIN AND SUBCUTANEOUS TISSUE, UNSPECIFIED: ICD-10-CM

## 2022-11-16 PROCEDURE — 99215 OFFICE O/P EST HI 40 MIN: CPT

## 2022-11-16 NOTE — REASON FOR VISIT
[Follow-Up: _____] : a [unfilled] follow-up visit [FreeTextEntry1] : regarding "a bump on my right forearm..."

## 2022-11-16 NOTE — PHYSICAL EXAM
[Respiratory Effort] : normal respiratory effort [Normal Rate and Rhythm] : normal rate and rhythm [No HSM] : no hepatosplenomegaly [Tender] : was nontender [Enlarged] : not enlarged [Alert] : alert [Oriented to Person] : oriented to person [Oriented to Place] : oriented to place [Oriented to Time] : oriented to time [Anxious] : anxious [de-identified] : Appears well, no acute distress, ambulates easily into the office.  [de-identified] : Remains normocephalic and atraumatic.  [de-identified] : Still supple with full range of motion.  [FreeTextEntry1] : No palpable cervical, supraclavicular or axillary adenopathy.  [de-identified] : Deferred.  [de-identified] : Soft, non tense and non tender. [de-identified] : Deferred.  [de-identified] : Deferred.  [de-identified] : Grossly symmetric, within normal limits without motor or sensory deficit.  [de-identified] : Soft, rubbery, semi-mobile soft tissue lesion ~ 1 cm in size in deep dermal layer or superficial SQ of dorsal aspect of proximal right forearm.  No visible cellulitis.  No palpable fluctuance.  Non tender to manipulation today. [de-identified] : though quite appropriately so...

## 2022-11-16 NOTE — PLAN
[FreeTextEntry1] : Minimally symptomatic, but persistent soft tissue lesion SQ of right forearm- separate from skin per dedicated soft tissue ultrasound but above underlying fascial tissue planes.\par History suggestive of benign cystic lesion/ collection.  \par Exam today with slight improvement, though palpable lesion present.\par Reassurance provided for the present without indication for immediate aspiration or drainage.\par Possible ongoing observation with watchful waiting discussed.\par However, both Mr. Escamilla and his mother are eager to proceed with definitive management and diagnosis with excision in light of the unknown or unclear underlying pathology as well as the patient’s understandable anxiety.  \par \par The risks, benefits and nature of the operative intervention have been reviewed with Mr. ESCAMILLA at length, including but not limited to the approximate location and size of the expected or typical operative incision, the probability of a visible post operative “scar” or local skin changes and the possibilities of bleeding, infection, seroma, lymphedema or future lesion recurrence.   \par \par Mr. ESCAMILLA  is aware that future recommendations or procedures may be required pending the operative findings and the final Pathology report.  \par \par Mr. ESCAMILLA  demonstrates good insight and remains eager to proceed.  While there are further specific concerns presently, I have encouraged Mr. ESCAMILLA  to follow-up with me and my office at any time in the interim to discuss any future questions.  \par \par Our plan will be for an elective outpatient procedure following any standard pre-surgical testing or medical clearance as required by the appropriate facility.\par \par Please note that more than 50% of face to face time was spent in counseling and coordination of care..

## 2022-11-16 NOTE — REVIEW OF SYSTEMS
[Feeling Poorly] : not feeling poorly [Feeling Tired] : not feeling tired [As Noted in HPI] : as noted in HPI [Anxiety] : anxiety [Depression] : no depression [Negative] : Heme/Lymph [de-identified] : regarding this issue and visit, though somewhat less so overall...

## 2022-11-16 NOTE — HISTORY OF PRESENT ILLNESS
[de-identified] : Very pleasant though admittedly anxious young man presenting to the office today in the company of his mother.\par They describe a visible/ palpable discrete lesion or small inflammatory collection/ cyst/ lesion along the posterior aspect of his right arm since ~ July.\par Mr. Wiley believes that it may be secondary to "an insect bite."\par However, he denies kyle discomfort/ pain or any odor/ drainage.\par He admits that his main concern is that "I don't know what it is... and it hasn't really gone away... sometimes it is better... sometimes it is worse..." [de-identified] : Very pleasant though anxious young man returning to the office in the company of his mother.\par They describe a visible/ palpable discrete lesion along the posterior aspect of his right arm since ~ July.\par Mr. Wiley denies kyle discomfort/ pain or odor/ drainage.\par He and his mother state that it is "improved since last visit, but not all gone."\par He reports that his main concern remains that "I don't know what it is... and it hasn't really gone away... sometimes it is better... sometimes it is worse... I don't want to deal with this again in the future..."

## 2022-11-16 NOTE — DATA REVIEWED
[FreeTextEntry1] : EXAM: [id number] - US NONVASC EXT LTD RT  - ORDERED BY: DOMENIC PEREZ\par \par PROCEDURE DATE:  11/09/2022  \par  \par INTERPRETATION:  Clinical indication palpable abnormality dorsum of \par forearm status post antibiotics\par \par Targeted ultrasound right forearm was performed.\par \par FINDINGS:\par In the subcutaneous tissues just below the dermal surface is a small 0.9 \par x 0.3 x 0.9 cm complex cystic area which does not extend into the \par underlying musculature. No definitive communication to the skin can be \par seen.. No foreign body identified.\par \par IMPRESSION:\par Small subdermal 0.9 x 0.3 x 0.9 cm heterogeneous fluid seen without deep \par extension or communicating to the skin\par \par --- End of Report ---\par \par JUDY PINTO MD; Attending Radiologist \par This document has been electronically signed. Nov 9 2022 11:33AM

## 2022-11-29 DIAGNOSIS — G47.33 OBSTRUCTIVE SLEEP APNEA (ADULT) (PEDIATRIC): ICD-10-CM

## 2022-11-30 ENCOUNTER — APPOINTMENT (OUTPATIENT)
Dept: SURGERY | Facility: HOSPITAL | Age: 27
End: 2022-11-30

## 2022-12-16 ENCOUNTER — APPOINTMENT (OUTPATIENT)
Dept: SURGERY | Facility: CLINIC | Age: 27
End: 2022-12-16

## 2023-03-10 ENCOUNTER — NON-APPOINTMENT (OUTPATIENT)
Age: 28
End: 2023-03-10

## 2023-03-10 ENCOUNTER — APPOINTMENT (OUTPATIENT)
Dept: PULMONOLOGY | Facility: CLINIC | Age: 28
End: 2023-03-10
Payer: COMMERCIAL

## 2023-03-10 VITALS
SYSTOLIC BLOOD PRESSURE: 110 MMHG | HEIGHT: 72 IN | RESPIRATION RATE: 16 BRPM | DIASTOLIC BLOOD PRESSURE: 80 MMHG | WEIGHT: 195 LBS | TEMPERATURE: 97.3 F | BODY MASS INDEX: 26.41 KG/M2

## 2023-03-10 VITALS — OXYGEN SATURATION: 98 % | HEART RATE: 70 BPM

## 2023-03-10 PROCEDURE — 99214 OFFICE O/P EST MOD 30 MIN: CPT | Mod: 25

## 2023-03-10 PROCEDURE — 95012 NITRIC OXIDE EXP GAS DETER: CPT

## 2023-03-10 PROCEDURE — 94010 BREATHING CAPACITY TEST: CPT

## 2023-03-10 RX ORDER — ALBUTEROL SULFATE 2.5 MG/3ML
(2.5 MG/3ML) SOLUTION RESPIRATORY (INHALATION)
Qty: 120 | Refills: 5 | Status: ACTIVE | COMMUNITY
Start: 2023-03-10 | End: 1900-01-01

## 2023-03-10 NOTE — ADDENDUM
[FreeTextEntry1] : Documented by Fede Figueroa acting as a scribe for Dr. Shawn Alves on (03/10/2023).\par \par All medical record entries made by the Scribe were at my, Dr. Shawn Alves's, direction and personally dictated by me on (03/10/2023). I have reviewed the chart and agree that the record accurately reflects my personal performance of the history, physical exam, assessment and plan. I have personally directed, reviewed, and agree with the discharge instructions.

## 2023-03-10 NOTE — PROCEDURE
[FreeTextEntry1] : FENO was 11; a normal value being less than 25\par Fractional exhaled nitric oxide (FENO) is regarded as a simple, noninvasive method for assessing eosinophilic airway inflammation. Produced by a variety of cells within the lung, nitric oxide (NO) concentrations are generally low in healthy individuals. However, high concentrations of NO appear to be involved in nonspecific host defense mechanisms and chronic inflammatory diseases such as asthma. The American Thoracic Society (ATS) therefore has recommended using FENO to aid in the diagnosis and monitoring of eosinophilic airway inflammation and asthma, and for identifying steroid responsive individuals whose chronic respiratory symptoms may be caused by airway inflammation. \par \par PFT reveals normal flows, with an FEV1 of  4.91L, which is 102% of predicted, with a normal flow volume loop

## 2023-03-10 NOTE — PHYSICAL EXAM

## 2023-03-10 NOTE — ASSESSMENT
[FreeTextEntry1] : Mr. ESCAMILLA  is a 27 year old male with a history of OCD, depression, asthma, allergies, urticaria, s/p COVID-19 6/2022 who now comes to the office for a follow up pulmonary evaluation for SOB, mild and intermittent asthma, allergies, urticaria, elevated IgE/eosinophils- active asthma (3/2023) s/p URI\par \par His shortness of breath is multifactorial due to:\par -poor mechanics of breathing \par -out of shape / over weight \par - pulmonary disease\par    - mild and intermittent asthma \par -?cardiac disease (doubt)\par \par problem 1: Mild and intermittent asthma (Active) \par -Symbicort 160 2 inhalations BID \par -Ventolin 2 puffs Q6H, pre-exercise \par - s/p blood work: alpha-1 antitrypsin level WNL\par - Nebulizer Albuterol/ Budesonide  0.5 mg BID  \par  - Asthma is believed to be caused by inherited (genetic) and environmental factor, but its exact cause is unknown. Asthma may be triggered by allergens, lung infections, or irritants in the air. Asthma triggers are different for each person.\par -Inhaler technique reviewed as well as oral hygiene techniques reviewed with patient. Avoidance of cold air, extremes of temperature, rescue inhaler should be used before exercise. Order of medication reviewed with patient. Recommended use of a cool mist humidifier in the bedroom.\par \par problem 1A: Elevated Ige\par -Xolair is a recombinant DNA- derived humanized IgG1K monoclonal antibody that selectively binds ot human immunoglobulin E (IgE). Xolair is produced by a Chinese hamster ovary cell suspension culture in nutrient medium containing the antibiotic gentamicin. Gentamicin is not detectable in the final product. Xolair is a sterile, white, preservative free, lyophilized powder contained in a single use vial that is reconstituted with sterile water for suspension. Side effects include: wheezing, tightness of the chest, trouble breathing, hives, skin rash, feeling anxious or light-headed, fainting, warmth or tingling under skin, or swelling of face, lips, or tongue \par \par problem 1B: Eosinophilic asthma\par - The safety and efficacy of Nucala was established in three double-blind, randomized, placebo-controlled trials in patients with severe asthma. Compared to a placebo, patients with severe asthma receiving Nucala had fewer exacerbation requiring hospitalization and/or emergency department visits, and a longer time to first exacerbation. In addition, patients with severe asthma receiving Nucala or Fasenra experienced greater reductions in their daily maintenance oral corticosteroid dose, while maintaining asthma control compared with patients receiving placebo. Treatment with Nucala did not result in a significant improvement in lung function, as measured by the volume of air exhaled by patients in one second. The most common side effects include: headache, injection site reactions, back pain, weakness, and fatigue; hypersensitivity reactions can occur within hours or days including swelling of the face, mouth, and tongue, fainting, dizziness, hives, breathing problems, and rash; herpes zoster infections have occurred. The drug is a monoclonal antibody that inhibits interleukin-5 which helps regular eosinophils, a type of white blood cell that contributes to asthma. The over-production of eosinophils can cause inflammation in the lungs, increasing the frequency of asthma attacks. Patients must also take other medications, including high dose inhaled corticosteroids and at least one additional asthma drug. \par \par problem 2: Allergies / Urticaria \par -add Olopatadine 0.6% 1 sniff BID \par -continue Xyzal 5 mg QHS \par - gets allergy shots \par -s/p  script given for blood work:+ asthma profile, + food IgE panel, + eosinophil level,+ IgE level, Vitamin D level normal \par - Environmental measures for allergies were encouraged including mattress and pillow cover, air purifier, and environmental controls.\par \par \par Problem 3: ?OSAS (risk factors: snoring, neck size 16 inches, high mallampati class) \par - s/p HSS Not present \par -Sleep apnea is associated with adverse clinical consequences which can affect most organ systems.  Cardiovascular disease risk includes arrhythmias, atrial fibrillation, hypertension, coronary artery disease, and stroke. Metabolic disorders include diabetes type 2, non-alcoholic fatty liver disease. Mood disorder especially depression; and cognitive decline especially in the elderly. Associations with  chronic reflux/Bennett’s esophagus some but not all inclusive. \par -Reasons  include arousal consistent with hypopnea; respiratory events most prominent in REM sleep or supine position; therefore sleep staging and body position are important for accurate diagnosis and estimation of AHI.\par \par \par problem 4 : cardiac disease (doubtful) \par -recommended to continue to follow up with Cardiologist \par \par problem 5: poor breathing mechanics\par -recommended Wifrances Hancock and Omayra breathing techniques \par -Proper breathing techniques were reviewed with an emphasis of exhalation. Patient instructed to breath in for 1 second and out for four seconds. Patient was encouraged to not talk while walking. \par \par problem 6 :  overweight/ out of shape \par - recommended 150 min of exercise a week \par - Recommended "10-Day Detox" by Joe Linn for 2-3 weeks followed by probiotics \par -Weight loss, exercise, and diet control were discussed and are highly encouraged. Treatment options are given such as, aqua therapy, and contacting a nutritionist. Recommended to use the elliptical, stationary bike, less use of treadmill. \par \par Problem 7: s/p COVID-19 6/2022 \par - s/p COVID-19 vaccine x3  \par \par problem 8: health maintenance \par -recommended yearly flu shot after October 15 2022\par -recommended strep pneumonia vaccines: Prevnar-13 vaccine, followed by Pneumo vaccine 23 one year following after 65 years old. \par -recommended early intervention for Upper Respiratory Infections (URIs)\par -recommended regular osteoporosis evaluations\par -recommended early dermatological evaluations\par -recommended after the age of 50 to the age of 70, colonoscopy every 5 years\par \par F/U in 6-8 weeks.\par He is encouraged to call with any changes, concerns, or questions\par

## 2023-03-10 NOTE — HISTORY OF PRESENT ILLNESS
[TextBox_4] : Mr. ESCAMILLA is a 27 year old male presenting to the office today for follow up pulmonary evaluation. His chief complaint is\par - s/p cold about a month ago and is currently wheezing throughout the day \par - he notes brining up mucus \par - he notes sinus are congested\par - he notes PND \par - he notes sleep could be better \par - he notes work is busy\par - he notes work anxiety \par - he notes urticaria has subsided \par \par - He  denies any headaches, nausea, vomiting, fever, chills, sweats, chest pains, chest pressure, diarrhea, constipation, dysphagia, myalgia, dizziness, leg swelling, leg pain, itchy eyes, itchy ears, heartburn, reflux, or sour taste in the mouth.

## 2023-03-23 ENCOUNTER — APPOINTMENT (OUTPATIENT)
Dept: PULMONOLOGY | Facility: CLINIC | Age: 28
End: 2023-03-23
Payer: COMMERCIAL

## 2023-03-23 VITALS
TEMPERATURE: 98 F | SYSTOLIC BLOOD PRESSURE: 110 MMHG | BODY MASS INDEX: 26.41 KG/M2 | HEART RATE: 81 BPM | DIASTOLIC BLOOD PRESSURE: 70 MMHG | OXYGEN SATURATION: 98 % | RESPIRATION RATE: 16 BRPM | WEIGHT: 195 LBS | HEIGHT: 72 IN

## 2023-03-23 DIAGNOSIS — J82.83 EOSINOPHILIC ASTHMA: ICD-10-CM

## 2023-03-23 DIAGNOSIS — J45.909 UNSPECIFIED ASTHMA, UNCOMPLICATED: ICD-10-CM

## 2023-03-23 DIAGNOSIS — U07.1 COVID-19: ICD-10-CM

## 2023-03-23 DIAGNOSIS — R76.8 OTHER SPECIFIED ABNORMAL IMMUNOLOGICAL FINDINGS IN SERUM: ICD-10-CM

## 2023-03-23 DIAGNOSIS — E66.3 OVERWEIGHT: ICD-10-CM

## 2023-03-23 DIAGNOSIS — T78.40XA ALLERGY, UNSPECIFIED, INITIAL ENCOUNTER: ICD-10-CM

## 2023-03-23 DIAGNOSIS — R06.83 SNORING: ICD-10-CM

## 2023-03-23 DIAGNOSIS — L50.9 URTICARIA, UNSPECIFIED: ICD-10-CM

## 2023-03-23 PROCEDURE — 99214 OFFICE O/P EST MOD 30 MIN: CPT

## 2023-03-23 RX ORDER — BUDESONIDE AND FORMOTEROL FUMARATE DIHYDRATE 160; 4.5 UG/1; UG/1
160-4.5 AEROSOL RESPIRATORY (INHALATION) TWICE DAILY
Qty: 3 | Refills: 1 | Status: DISCONTINUED | COMMUNITY
Start: 2022-09-02 | End: 2023-03-23

## 2023-03-23 RX ORDER — BUDESONIDE 0.5 MG/2ML
0.5 INHALANT ORAL TWICE DAILY
Qty: 60 | Refills: 3 | Status: DISCONTINUED | COMMUNITY
Start: 2023-03-10 | End: 2023-03-23

## 2023-03-23 NOTE — PHYSICAL EXAM
[No Acute Distress] : no acute distress [Normal Oropharynx] : normal oropharynx [Normal Appearance] : normal appearance [No Neck Mass] : no neck mass [Normal Rate/Rhythm] : normal rate/rhythm [Normal S1, S2] : normal s1, s2 [No Murmurs] : no murmurs [No Resp Distress] : no resp distress [Clear to Auscultation Bilaterally] : clear to auscultation bilaterally [No Abnormalities] : no abnormalities [Kyphosis] : kyphosis [Benign] : benign [Normal Gait] : normal gait [No Clubbing] : no clubbing [No Cyanosis] : no cyanosis [No Edema] : no edema [FROM] : FROM [Normal Color/ Pigmentation] : normal color/ pigmentation [No Focal Deficits] : no focal deficits [Oriented x3] : oriented x3 [Normal Affect] : normal affect [II] : Mallampati Class: II [TextBox_68] : I:E 1:3, clear

## 2023-03-23 NOTE — HISTORY OF PRESENT ILLNESS
[TextBox_4] : Mr. ESCAMILLA is a 27 year old male presenting to the office today for follow up pulmonary evaluation. His chief complaint is \par - he is now seeing a psychiatrist and he was on Effexor. 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 \par - he notes physically he feels okay but mentally he's not doing so well. \par - he notes sleep is still very shaky. He has had inconsistent sleeping durations, some nights he sleeps better than others. \par - he tries to shut off his phone by 11pm. \par - bowels are regular\par - allergies controlled right now \par - he's no longer getting urticaria\par -He denies any visual issues, headaches, nausea, vomiting, fever, chills, sweats, chest pains, chest pressure, diarrhea, constipation, dysphagia, myalgia, dizziness, leg swelling, leg pain, itchy eyes, itchy ears, heartburn, reflux, or sour taste in the mouth.\par

## 2023-03-23 NOTE — ADDENDUM
[FreeTextEntry1] : Documented by Uzma Grande acting as a scribe for Dr. Shawn Alves on 03/23/2023 \par \par All medical record entries made by the Scribe were at my, Dr. Shawn Alves's, direction and personally dictated by me on 03/23/2023 . I have reviewed the chart and agree that the record accurately reflects my personal performance of the history, physical exam, assessment and plan. I have also personally directed, reviewed, and agree with the discharge instructions.

## 2023-03-23 NOTE — ASSESSMENT
[FreeTextEntry1] : Mr. ESCAMILLA  is a 27 year old male with a history of OCD, depression, asthma, allergies, urticaria, s/p COVID-19 6/2022 who now comes to the office for a follow up pulmonary evaluation for SOB, mild and intermittent asthma, allergies, urticaria, elevated IgE/eosinophils- s/p active asthma (3/2023) s/p URI (resolved) - but steroid psychosis \par \par His shortness of breath is multifactorial due to:\par -poor mechanics of breathing \par -out of shape / over weight \par - pulmonary disease\par    - mild and intermittent asthma \par -?cardiac disease (doubt)\par \par problem 1: Mild and intermittent asthma (Active) \par -(Unable) Symbicort 160 2 inhalations BID \par -(Unable) Nebulizer Albuterol/ Budesonide  0.5 mg BID  \par - Add Zyflo 120 BID \par - Add Stiolto 2 puffs QD \par -Ventolin 2 puffs Q6H, pre-exercise \par - s/p blood work: alpha-1 antitrypsin level WNL\par  - Asthma is believed to be caused by inherited (genetic) and environmental factor, but its exact cause is unknown. Asthma may be triggered by allergens, lung infections, or irritants in the air. Asthma triggers are different for each person.\par -Inhaler technique reviewed as well as oral hygiene techniques reviewed with patient. Avoidance of cold air, extremes of temperature, rescue inhaler should be used before exercise. Order of medication reviewed with patient. Recommended use of a cool mist humidifier in the bedroom.\par \par problem 1A: Elevated Ige\par - Xolair Candidate \par -Xolair is a recombinant DNA- derived humanized IgG1K monoclonal antibody that selectively binds ot human immunoglobulin E (IgE). Xolair is produced by a Chinese hamster ovary cell suspension culture in nutrient medium containing the antibiotic gentamicin. Gentamicin is not detectable in the final product. Xolair is a sterile, white, preservative free, lyophilized powder contained in a single use vial that is reconstituted with sterile water for suspension. Side effects include: wheezing, tightness of the chest, trouble breathing, hives, skin rash, feeling anxious or light-headed, fainting, warmth or tingling under skin, or swelling of face, lips, or tongue \par \par problem 1B: Eosinophilic asthma\par - Candidate for Nucala / Fasenra / Dupixent \par - The safety and efficacy of Nucala was established in three double-blind, randomized, placebo-controlled trials in patients with severe asthma. Compared to a placebo, patients with severe asthma receiving Nucala had fewer exacerbation requiring hospitalization and/or emergency department visits, and a longer time to first exacerbation. In addition, patients with severe asthma receiving Nucala or Fasenra experienced greater reductions in their daily maintenance oral corticosteroid dose, while maintaining asthma control compared with patients receiving placebo. Treatment with Nucala did not result in a significant improvement in lung function, as measured by the volume of air exhaled by patients in one second. The most common side effects include: headache, injection site reactions, back pain, weakness, and fatigue; hypersensitivity reactions can occur within hours or days including swelling of the face, mouth, and tongue, fainting, dizziness, hives, breathing problems, and rash; herpes zoster infections have occurred. The drug is a monoclonal antibody that inhibits interleukin-5 which helps regular eosinophils, a type of white blood cell that contributes to asthma. The over-production of eosinophils can cause inflammation in the lungs, increasing the frequency of asthma attacks. Patients must also take other medications, including high dose inhaled corticosteroids and at least one additional asthma drug. \par Dupixent is a prescription medicine used with other asthma medicines for the maintenance treatment of moderate-to-severe asthma in people aged 12 years and older whose asthma is not controlled with their current asthma medicines. Dupixent helps prevent severe asthma attacks (exacerbations) and can improve your breathing. Dupixent may also help reduce the amount of oral corticosteroids you need while preventing severe asthma attacks and improving your breathing. Dupixent is not used to treat sudden breathing problems. Risks and side effect of Dupixent were discussed and reviewed with patient. \par \par problem 2: Allergies / Urticaria \par -add Olopatadine 0.6% 1 sniff BID \par -continue Xyzal 5 mg QHS \par - gets allergy shots \par -s/p  script given for blood work:+ asthma profile, + food IgE panel, + eosinophil level,+ IgE level, Vitamin D level normal \par - Environmental measures for allergies were encouraged including mattress and pillow cover, air purifier, and environmental controls.\par \par \par Problem 3: ?OSAS (risk factors: snoring, neck size 16 inches, high mallampati class) \par - s/p HSS Not present \par -Sleep apnea is associated with adverse clinical consequences which can affect most organ systems.  Cardiovascular disease risk includes arrhythmias, atrial fibrillation, hypertension, coronary artery disease, and stroke. Metabolic disorders include diabetes type 2, non-alcoholic fatty liver disease. Mood disorder especially depression; and cognitive decline especially in the elderly. Associations with  chronic reflux/Bennett’s esophagus some but not all inclusive. \par -Reasons  include arousal consistent with hypopnea; respiratory events most prominent in REM sleep or supine position; therefore sleep staging and body position are important for accurate diagnosis and estimation of AHI.\par \par \par problem 4 : cardiac disease (doubtful) \par -recommended to continue to follow up with Cardiologist \par \par problem 5: poor breathing mechanics\par -recommended Wim Hof and Buteyko breathing techniques \par -Proper breathing techniques were reviewed with an emphasis of exhalation. Patient instructed to breath in for 1 second and out for four seconds. Patient was encouraged to not talk while walking. \par \par problem 6 :  overweight/ out of shape \par - recommended 150 min of exercise a week \par - Recommended "10-Day Detox" by Joe Linn for 2-3 weeks followed by probiotics \par -Weight loss, exercise, and diet control were discussed and are highly encouraged. Treatment options are given such as, aqua therapy, and contacting a nutritionist. Recommended to use the elliptical, stationary bike, less use of treadmill. \par \par Problem 7: s/p COVID-19 6/2022 \par - s/p COVID-19 vaccine x3  \par \par problem 8: health maintenance \par -recommended yearly flu shot after October 15 2022\par -recommended strep pneumonia vaccines: Prevnar-13 vaccine, followed by Pneumo vaccine 23 one year following after 65 years old. \par -recommended early intervention for Upper Respiratory Infections (URIs)\par -recommended regular osteoporosis evaluations\par -recommended early dermatological evaluations\par -recommended after the age of 50 to the age of 70, colonoscopy every 5 years\par \par F/U in 6-8 weeks.\par He is encouraged to call with any changes, concerns, or questions\par

## 2023-03-28 ENCOUNTER — TRANSCRIPTION ENCOUNTER (OUTPATIENT)
Age: 28
End: 2023-03-28

## 2023-03-31 ENCOUNTER — TRANSCRIPTION ENCOUNTER (OUTPATIENT)
Age: 28
End: 2023-03-31

## 2023-04-03 ENCOUNTER — NON-APPOINTMENT (OUTPATIENT)
Age: 28
End: 2023-04-03

## 2023-06-03 ENCOUNTER — EMERGENCY (EMERGENCY)
Facility: HOSPITAL | Age: 28
LOS: 0 days | Discharge: HOME | End: 2023-06-03
Attending: EMERGENCY MEDICINE
Payer: COMMERCIAL

## 2023-06-03 VITALS
DIASTOLIC BLOOD PRESSURE: 87 MMHG | TEMPERATURE: 98 F | SYSTOLIC BLOOD PRESSURE: 126 MMHG | OXYGEN SATURATION: 100 % | RESPIRATION RATE: 18 BRPM | HEART RATE: 62 BPM

## 2023-06-03 VITALS — HEIGHT: 73 IN | WEIGHT: 199.96 LBS

## 2023-06-03 DIAGNOSIS — Z56.0 UNEMPLOYMENT, UNSPECIFIED: ICD-10-CM

## 2023-06-03 DIAGNOSIS — Z20.822 CONTACT WITH AND (SUSPECTED) EXPOSURE TO COVID-19: ICD-10-CM

## 2023-06-03 DIAGNOSIS — Z91.018 ALLERGY TO OTHER FOODS: ICD-10-CM

## 2023-06-03 DIAGNOSIS — F32.9 MAJOR DEPRESSIVE DISORDER, SINGLE EPISODE, UNSPECIFIED: ICD-10-CM

## 2023-06-03 DIAGNOSIS — F41.9 ANXIETY DISORDER, UNSPECIFIED: ICD-10-CM

## 2023-06-03 DIAGNOSIS — F60.5 OBSESSIVE-COMPULSIVE PERSONALITY DISORDER: ICD-10-CM
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
## 2023-06-03 DIAGNOSIS — R53.83 OTHER FATIGUE: ICD-10-CM

## 2023-06-03 DIAGNOSIS — R45.851 SUICIDAL IDEATIONS: ICD-10-CM

## 2023-06-03 DIAGNOSIS — F84.0 AUTISTIC DISORDER: ICD-10-CM

## 2023-06-03 DIAGNOSIS — F32.A DEPRESSION, UNSPECIFIED: ICD-10-CM

## 2023-06-03 DIAGNOSIS — R45.1 RESTLESSNESS AND AGITATION: ICD-10-CM

## 2023-06-03 LAB
ALBUMIN SERPL ELPH-MCNC: 4 G/DL — SIGNIFICANT CHANGE UP (ref 3.3–5)
ALP SERPL-CCNC: 72 U/L — SIGNIFICANT CHANGE UP (ref 40–120)
ALT FLD-CCNC: 28 U/L — SIGNIFICANT CHANGE UP (ref 12–78)
AMPHET UR-MCNC: NEGATIVE — SIGNIFICANT CHANGE UP
ANION GAP SERPL CALC-SCNC: 4 MMOL/L — LOW (ref 5–17)
APAP SERPL-MCNC: <2 UG/ML — LOW (ref 10–30)
APPEARANCE UR: CLEAR — SIGNIFICANT CHANGE UP
AST SERPL-CCNC: 25 U/L — SIGNIFICANT CHANGE UP (ref 15–37)
BARBITURATES UR SCN-MCNC: NEGATIVE — SIGNIFICANT CHANGE UP
BASOPHILS # BLD AUTO: 0 K/UL — SIGNIFICANT CHANGE UP (ref 0–0.2)
BASOPHILS NFR BLD AUTO: 0 % — SIGNIFICANT CHANGE UP (ref 0–2)
BENZODIAZ UR-MCNC: NEGATIVE — SIGNIFICANT CHANGE UP
BILIRUB SERPL-MCNC: 0.2 MG/DL — SIGNIFICANT CHANGE UP (ref 0.2–1.2)
BILIRUB UR-MCNC: NEGATIVE — SIGNIFICANT CHANGE UP
BUN SERPL-MCNC: 21 MG/DL — SIGNIFICANT CHANGE UP (ref 7–23)
CALCIUM SERPL-MCNC: 9.1 MG/DL — SIGNIFICANT CHANGE UP (ref 8.5–10.1)
CHLORIDE SERPL-SCNC: 107 MMOL/L — SIGNIFICANT CHANGE UP (ref 96–108)
CO2 SERPL-SCNC: 29 MMOL/L — SIGNIFICANT CHANGE UP (ref 22–31)
COCAINE METAB.OTHER UR-MCNC: NEGATIVE — SIGNIFICANT CHANGE UP
COLOR SPEC: YELLOW — SIGNIFICANT CHANGE UP
CREAT SERPL-MCNC: 1.25 MG/DL — SIGNIFICANT CHANGE UP (ref 0.5–1.3)
DIFF PNL FLD: NEGATIVE — SIGNIFICANT CHANGE UP
EGFR: 82 ML/MIN/1.73M2 — SIGNIFICANT CHANGE UP
EOSINOPHIL # BLD AUTO: 0 K/UL — SIGNIFICANT CHANGE UP (ref 0–0.5)
EOSINOPHIL NFR BLD AUTO: 0 % — SIGNIFICANT CHANGE UP (ref 0–6)
ETHANOL SERPL-MCNC: <10 MG/DL — SIGNIFICANT CHANGE UP (ref 0–10)
GLUCOSE SERPL-MCNC: 97 MG/DL — SIGNIFICANT CHANGE UP (ref 70–99)
GLUCOSE UR QL: NEGATIVE — SIGNIFICANT CHANGE UP
HCT VFR BLD CALC: 45.7 % — SIGNIFICANT CHANGE UP (ref 39–50)
HGB BLD-MCNC: 15.7 G/DL — SIGNIFICANT CHANGE UP (ref 13–17)
IMM GRANULOCYTES NFR BLD AUTO: 0.1 % — SIGNIFICANT CHANGE UP (ref 0–0.9)
KETONES UR-MCNC: NEGATIVE — SIGNIFICANT CHANGE UP
LEUKOCYTE ESTERASE UR-ACNC: NEGATIVE — SIGNIFICANT CHANGE UP
LYMPHOCYTES # BLD AUTO: 2.19 K/UL — SIGNIFICANT CHANGE UP (ref 1–3.3)
LYMPHOCYTES # BLD AUTO: 29.6 % — SIGNIFICANT CHANGE UP (ref 13–44)
MCHC RBC-ENTMCNC: 31 PG — SIGNIFICANT CHANGE UP (ref 27–34)
MCHC RBC-ENTMCNC: 34.4 GM/DL — SIGNIFICANT CHANGE UP (ref 32–36)
MCV RBC AUTO: 90.1 FL — SIGNIFICANT CHANGE UP (ref 80–100)
METHADONE UR-MCNC: NEGATIVE — SIGNIFICANT CHANGE UP
MONOCYTES # BLD AUTO: 0.53 K/UL — SIGNIFICANT CHANGE UP (ref 0–0.9)
MONOCYTES NFR BLD AUTO: 7.2 % — SIGNIFICANT CHANGE UP (ref 2–14)
NEUTROPHILS # BLD AUTO: 4.66 K/UL — SIGNIFICANT CHANGE UP (ref 1.8–7.4)
NEUTROPHILS NFR BLD AUTO: 63.1 % — SIGNIFICANT CHANGE UP (ref 43–77)
NITRITE UR-MCNC: NEGATIVE — SIGNIFICANT CHANGE UP
OPIATES UR-MCNC: NEGATIVE — SIGNIFICANT CHANGE UP
PCP SPEC-MCNC: SIGNIFICANT CHANGE UP
PCP UR-MCNC: NEGATIVE — SIGNIFICANT CHANGE UP
PH UR: 6 — SIGNIFICANT CHANGE UP (ref 5–8)
PLATELET # BLD AUTO: 249 K/UL — SIGNIFICANT CHANGE UP (ref 150–400)
POTASSIUM SERPL-MCNC: 4.6 MMOL/L — SIGNIFICANT CHANGE UP (ref 3.5–5.3)
POTASSIUM SERPL-SCNC: 4.6 MMOL/L — SIGNIFICANT CHANGE UP (ref 3.5–5.3)
PROT SERPL-MCNC: 7.2 GM/DL — SIGNIFICANT CHANGE UP (ref 6–8.3)
PROT UR-MCNC: NEGATIVE — SIGNIFICANT CHANGE UP
RBC # BLD: 5.07 M/UL — SIGNIFICANT CHANGE UP (ref 4.2–5.8)
RBC # FLD: 12.2 % — SIGNIFICANT CHANGE UP (ref 10.3–14.5)
SALICYLATES SERPL-MCNC: <1.7 MG/DL — LOW (ref 2.8–20)
SARS-COV-2 RNA SPEC QL NAA+PROBE: SIGNIFICANT CHANGE UP
SODIUM SERPL-SCNC: 140 MMOL/L — SIGNIFICANT CHANGE UP (ref 135–145)
SP GR SPEC: 1.01 — SIGNIFICANT CHANGE UP (ref 1.01–1.02)
THC UR QL: NEGATIVE — SIGNIFICANT CHANGE UP
UROBILINOGEN FLD QL: NEGATIVE — SIGNIFICANT CHANGE UP
WBC # BLD: 7.39 K/UL — SIGNIFICANT CHANGE UP (ref 3.8–10.5)
WBC # FLD AUTO: 7.39 K/UL — SIGNIFICANT CHANGE UP (ref 3.8–10.5)

## 2023-06-03 PROCEDURE — 99284 EMERGENCY DEPT VISIT MOD MDM: CPT | Mod: 25

## 2023-06-03 PROCEDURE — 93005 ELECTROCARDIOGRAM TRACING: CPT

## 2023-06-03 PROCEDURE — 93010 ELECTROCARDIOGRAM REPORT: CPT

## 2023-06-03 PROCEDURE — 81003 URINALYSIS AUTO W/O SCOPE: CPT

## 2023-06-03 PROCEDURE — 90792 PSYCH DIAG EVAL W/MED SRVCS: CPT | Mod: 95

## 2023-06-03 PROCEDURE — 36415 COLL VENOUS BLD VENIPUNCTURE: CPT

## 2023-06-03 PROCEDURE — 80307 DRUG TEST PRSMV CHEM ANLYZR: CPT

## 2023-06-03 PROCEDURE — 87635 SARS-COV-2 COVID-19 AMP PRB: CPT

## 2023-06-03 PROCEDURE — 85025 COMPLETE CBC W/AUTO DIFF WBC: CPT

## 2023-06-03 PROCEDURE — 99285 EMERGENCY DEPT VISIT HI MDM: CPT

## 2023-06-03 PROCEDURE — 80053 COMPREHEN METABOLIC PANEL: CPT

## 2023-06-03 RX ORDER — LAMOTRIGINE 25 MG/1
50 TABLET, ORALLY DISINTEGRATING ORAL ONCE
Refills: 0 | Status: COMPLETED | OUTPATIENT
Start: 2023-06-03 | End: 2023-06-03

## 2023-06-03 RX ORDER — HYDROXYZINE HCL 10 MG
1 TABLET ORAL
Qty: 20 | Refills: 0
Start: 2023-06-03 | End: 2023-06-07

## 2023-06-03 RX ADMIN — LAMOTRIGINE 50 MILLIGRAM(S): 25 TABLET, ORALLY DISINTEGRATING ORAL at 21:31

## 2023-06-03 SDOH — ECONOMIC STABILITY - INCOME SECURITY: UNEMPLOYMENT, UNSPECIFIED: Z56.0

## 2023-06-03 NOTE — ED PROVIDER NOTE - MUSCULOSKELETAL, MLM
FRIEND x4 Spine appears normal, range of motion is not limited, no muscle or joint tenderness FRIEND x4. Spine appears normal, range of motion is not limited, no muscle or joint tenderness

## 2023-06-03 NOTE — ED BEHAVIORAL HEALTH ASSESSMENT NOTE - CURRENT INTENT:
Instructions: This plan will send the code FBSE to the PM system.  DO NOT or CHANGE the price.
Price (Do Not Change): 0.00
Detail Level: Simple
None known

## 2023-06-03 NOTE — ED ADULT TRIAGE NOTE - CHIEF COMPLAINT QUOTE
Pt presents to er with mother Norma [phone], as per mother, pt is in a partial hospitalization program and started new medications Buspar and Seroquel, states her son has been increasingly agitated and said he had SI statements yesterday, pt admitted to SI statements he made yesterday, denies SI/HI at this time, sees  [facility] and [facility] agitated without aggressive behaviour.

## 2023-06-03 NOTE — ED PROVIDER NOTE - CONSTITUTIONAL, MLM
white adult male no respiratory distress non toxic normal... white adult male no respiratory distress- non-toxic

## 2023-06-03 NOTE — ED PROVIDER NOTE - NS_EDPROVIDERDISPOUSERTYPE_ED_A_ED
independent Scribe Attestation (For Scribes USE Only)... Attending Attestation (For Attendings USE Only).../Scribe Attestation (For Scribes USE Only)...

## 2023-06-03 NOTE — ED ADULT NURSE NOTE - CHIEF COMPLAINT QUOTE
Pt presents to er with mother Norma [phone], as per mother, pt is in a partial hospitalization program and started new medications Buspar and Seroquel, states her son has been increasingly agitated and said he had SI statements yesterday, pt admitted to SI statements he made yesterday, denies SI/HI at this time, sees  [facility] without aggressive behaviour.

## 2023-06-03 NOTE — ED ADULT NURSE NOTE - OBJECTIVE STATEMENT
t: 28 yo male wPMHx of high fuPt presents to ED with mother for SI statements at home. Mother states that patient has been getting more agitated and made SI statement yesterday. Pt recently started on new medications. pt hx of autism, anxiety, and depression. Pt has no plan to harm himself, denies HI. Pt denies EtOH/drug use.
aching

## 2023-06-03 NOTE — ED PROVIDER NOTE - PATIENT PORTAL LINK FT
You can access the FollowMyHealth Patient Portal offered by [facility] by registering at the following website: [email or web address]. By joining [employer]’s FollowMyHealth portal, you will also be able to view your health information using other applications (apps) compatible with our system.

## 2023-06-03 NOTE — ED BEHAVIORAL HEALTH ASSESSMENT NOTE - HPI (INCLUDE ILLNESS QUALITY, SEVERITY, DURATION, TIMING, CONTEXT, MODIFYING FACTORS, ASSOCIATED SIGNS AND SYMPTOMS)
Patient is a 26 yo domiciled, male, unemployed, single, recent discharged from residential facility in [place] about 1-2 weeks ago, currently in PHP, no known past psych admissions pphx of MDD and OCPD, pmhx as per chart, brought into the ED by mother as patient has been increasingly agitated at home. Telepsychiatry consulted for psych eval.    Upon approach patient is calm and cooperative. He reports that he has been feeling frustrated with his life at home. He reports little socialization with friends. He states that he went to yoga and craig chi today to get his energy out but was still very upset at home. He currently resides with mother (parents .) Patient identifies grandmother's passing in 2012, amid parent's divorce as major traumatic event in life. He reports poor relationship with his dad who has said to be dating many women and did not care for him when he stayed with him. Patient also reports feeling misunderstood. He states that he wishes he "just fast forward to 32." Patient reports sleeping and eating well. Patient denies any passive or active SI, HI or AVH. He reports feeling safe overall.    Collateral as per mother, patient had been seeing a psychiatrist regularly who "knows patient best," but had to hold off while patient was in residential treatment and is now in PHP. Patient was recently put on seroquel which is causing patient's mood to worsen. No safety concerns, would like to take patient home.

## 2023-06-03 NOTE — ED PROVIDER NOTE - NSFOLLOWUPINSTRUCTIONS_ED_ALL_ED_FT
Stop the Seroquel.  Continue your other medications as per routine.  Follow up with your own psychiatrist as already scheduled.  Atarax every 6 hours as needed for agitation.      Major Depressive Disorder, Adult  Major depressive disorder is a mental health condition. This disorder affects feelings. It can also affect the body. Symptoms of this condition last most of the day, almost every day, for 2 weeks. This disorder can affect:  Relationships.  Daily activities, such as work and school.  Activities that you normally like to do.  What are the causes?  The cause of this condition is not known. The disorder is likely caused by a mix of things, including:  Your personality, such as being a shy person.  Your behavior, or how you act toward others.  Your thoughts and feelings.  Too much alcohol or drugs.  How you react to stress.  Health and mental problems that you have had for a long time.  Things that hurt you in the past (trauma).  Big changes in your life, such as divorce.  What increases the risk?  The following factors may make you more likely to develop this condition:  Having family members with depression.  Being a woman.  Problems in the family.  Low levels of some brain chemicals.  Things that caused you pain as a child, especially if you lost a parent or were abused.  A lot of stress in your life, such as from:  Living without basic needs of life, such as food and shelter.  Being treated poorly because of race, sex, or [place] (discrimination).  Health and mental problems that you have had for a long time.  What are the signs or symptoms?  The main symptoms of this condition are:  Being sad all the time.  Being grouchy all the time.  Loss of interest in things and activities.  Other symptoms include:  Sleeping too much or too little.  Eating too much or too little.  Gaining or losing weight, without knowing why.  Feeling tired or having low energy.  Being restless and weak.  Feeling hopeless, worthless, or guilty.  Trouble thinking clearly or making decisions.  Thoughts of hurting yourself or others, or thoughts of ending your life.  Spending a lot of time alone.  Inability to complete common tasks of daily life.  If you have very bad MDD, you may:  Believe things that are not true.  Hear, see, taste, or feel things that are not there.  Have mild depression that lasts for at least 2 years.  Feel very sad and hopeless.  Have trouble speaking or moving.  How is this treated?  This condition may be treated with:  Talk therapy. This teaches you to know bad thoughts, feelings, and actions and how to change them.  This can also help you to communicate with others.  This can be done with members of your family.  Medicines. These can be used to treat worry (anxiety), depression, or low levels of chemicals in the brain.  Lifestyle changes. You may need to:  Limit alcohol use.  Limit drug use.  Get regular exercise.  Get plenty of sleep.  Make healthy eating choices.  Spend more time outdoors.  Brain stimulation. This treatment excites the brain. This is done when symptoms are very bad or have not gotten better with other treatments.  Follow these instructions at home:  Activity    Get regular exercise as told.  Spend time outdoors as told.  Make time to do the things you enjoy.  Find ways to deal with stress. Try to:  Meditate.  Do deep breathing.  Spend time in nature.  Keep a journal.  Return to your normal activities as told by your doctor. Ask your doctor what activities are safe for you.  Alcohol and drug use    If you drink alcohol:  Limit how much you use to:  0–1 drink a day for women.  0–2 drinks a day for men.  Be aware of how much alcohol is in your drink. In the [place] one drink equals one 12 oz bottle of beer (355 mL), one 5 oz glass of wine (148 mL), or one 1½ oz glass of hard liquor (44 mL).  Talk to your doctor about:  Alcohol use. Alcohol can affect some medicines.  Any drug use.  General instructions      Take over-the-counter and prescription medicines and herbal preparations only as told by your doctor.  Eat a healthy diet.  Get a lot of sleep.  Think about joining a support group. Your doctor may be able to suggest one.  Keep all follow-up visits as told by your doctor. This is important.  Where to find more information:    National [place] on Mental Illness: www.birdie.org  [place] National [place] of Mental Health: www.nimh.nih.gov  American Psychiatric Association: www.psychiatry.org/patients-families/  Contact a doctor if:  Your symptoms get worse.  You get new symptoms.  Get help right away if:  You hurt yourself.  You have serious thoughts about hurting yourself or others.  You see, hear, taste, smell, or feel things that are not there.  If you ever feel like you may hurt yourself or others, or have thoughts about taking your own life, get help right away. Go to your nearest emergency department or:  Call your local emergency services (769 in the [place]  Call a suicide crisis helpline, such as the National Suicide Prevention Lifeline at 1-472.896.2789 or 831 in the [place] This is open 24 hours a day in the [place]  Text the Crisis Text Line at 949202 (in the [place]  Summary  Major depressive disorder is a mental health condition. This disorder affects feelings. Symptoms of this condition last most of the day, almost every day, for 2 weeks.  The symptoms of this disorder can cause problems with relationships and with daily activities.  There are treatments and support for people who get this disorder. You may need more than one type of treatment.  Get help right away if you have serious thoughts about hurting yourself or others.  This information is not intended to replace advice given to you by your health care provider. Make sure you discuss any questions you have with your health care provider.

## 2023-06-03 NOTE — ED PROVIDER NOTE - CLINICAL SUMMARY MEDICAL DECISION MAKING FREE TEXT BOX
28 yo male wPMHx of high functional autism, anxiety, depression, OCPD presents to the ED with mother Norma ([phone]) for psychiatric evaluation. Per mom pt was dc last week 05/24 from a facility in [place], pt was there for 43 days. Medication changed while in program while also this past week which mother states has been causing significant irritability and argumentative. Pt has sense of despair and SI but with no plan. Pt cooperative non hostile admits to sense of despair. Plan: 1 to 1, EKG, tele psych consult, labs monitor, observe, reassess. 28 yo male wPMHx of high functional autism, anxiety, depression, OCPD presents to the ED with mother Norma ([phone]) for psychiatric evaluation. Per mom pt was dc last week 05/24 from a facility in [place], pt was there for 43 days. Medication changed while in program while also this past week which mother states has been causing significant irritability and argumentative. Pt has sense of despair and SI but with no plan. Pt cooperative not hostile admits to sense of despair.   Plan: 1 to 1, EKG, tele psych consult, labs monitor, observe, reassess.    19:50, Fred Haro for Dr. Dickey   EKG, labs normal.  Tele-Psych aware of ED consult.    22:22, JOHN Dickey MD:  Pt cleared by Tele-Psych for DC home, advises holding Seroquel & advises Atarax q6 hrs prn.  Pt already has outpt Psych f/u appt.   Will DC pt home. 28 yo WM, h/o high functional autism, anxiety, depression, OCPD, ambulatory to the ED with mother Norma ([phone]) for psychiatric evaluation. Per mom pt was dc last week 05/24 from a facility in [place]( inpt for 43 days). Meds changed while in program, also this past week which mother states has been causing pt significant irritability and argumentative. Pt has sense of despair and SI but with no plan. Pt cooperative, not hostile admits to sense of despair.   Plan: 1 to 1, EKG, tele-psych consult, labs; monitor, observe, reassess.    19:50, Fred Haro for Dr. Dickey   EKG, labs normal.  Tele-Psych aware of ED consult.    22:22, JOHN Dickey MD:  Pt cleared by Tele-Psych for DC home, advises holding Seroquel & advises Atarax q6 hrs prn.  Pt already has outpt Psych f/u appt.   Will DC pt home.

## 2023-06-03 NOTE — ED PROVIDER NOTE - PROGRESS NOTE DETAILS
Fred Dickey   EKG labs normal tele psych aware of ed consult Fred Dickey   EKG, labs normal.  Tele-Psych aware of ED consult.

## 2023-06-03 NOTE — ED BEHAVIORAL HEALTH NOTE - BEHAVIORAL HEALTH NOTE
==================     PRE-HOSPITAL COURSE     ===================     SOURCE: [facility] ED Documentation     DETAILS: Pt was BIB mother for increased agitation after starting new medications and endorsing SI with no plan.        ============     ED COURSE     ============     SOURCE: [facility] ED Documentation and Rachel Lozoya RN      ARRIVAL: Pt was BIB mother.      BELONGINGS: Secured with mother. Pt is in a gown with a 1:1.     BEHAVIOR: According to chart, patient has been presenting as calm, cooperative, with good eye contact, clear speech, and logical thought process. The patient was compliant with getting his COVID Swab, blood work and urine toxicology. RN reported patient was having a panic attack when getting his EKG done and was afraid that he would get shocked. Patient was redirectable by nursing assitant. RN did not report any violent or aggressive behavior from the patient. The patient has appropriate grooming and hygiene. Patient has no marks, bruises, or lacerations on his body. RN reported the patient ambulates without assistance. RN reported patient did not endorse SI/HI/AVH to staff.     TREATMENT: No medications needed.      VISITORS: Mom is at bedside.      ------------------------------------------------     COVID Exposure Screen- collateral (i.e. third-party, chart review, belongings, etc; include EMS and ED staff)     ---------------------------------------------------     1. Has the patient had a COVID-19 test in the last 90 days? Unknown.     2. Has the patient tested positive for COVID-19 antibodies? Unknown.     3.Has the patient received 2 doses of the COVID-19 vaccine?  Unknown.     4. In the past 10 days, has the patient been around anyone with a positive COVID-19 test?* Unknown.     5.Has the patient been out of [place] within the past 10 days? Unknown.

## 2023-06-03 NOTE — ED BEHAVIORAL HEALTH ASSESSMENT NOTE - SUMMARY
complete note to follow. patient is psychiatrically cleared. Patient is a 28 yo domiciled, male, unemployed, single, recent discharged from residential facility in [place] about 1-2 weeks ago, currently in PHP, no known past psych admissions pphx of MDD and OCPD, pmhx as per chart, brought into the ED by mother as patient has been increasingly agitated at home. Telepsychiatry consulted for psych eval. Upon assessment today patient is calm and cooperative with normal speech, linear thought process, well related, no thought/perceptual abnormalities. Patient endorses changes in mood which he attributes to OCPD and lack of socializing with peers. at this bryant addis does not meet criteria for inpatient psych admission due to lack of acute mood or psychosis illness. Patient to follow up at [facility] on Monday and will likely switch back to care with Dr. Cloud (long standing outpatient psychiatrist. While patient carries increased risk factors or age, gender, pphx, he has several strength including supervision and support from family, being engaged in treatment and no known substance use.

## 2023-06-03 NOTE — ED PROVIDER NOTE - OBJECTIVE STATEMENT
28 yo male wPMHx of high functional autism, anxiety, depression, OCPD presents to the ED with mother Norma ([phone]) for psychiatric evaluation. Per mom pt was dc last week 05/24 from a facility in [place], pt was there for 43 days. Per mother, pt is in a partial hospitalization program started last Thursday and started new medication Buspar and Seroquel while on lower dose of Effexor, states her son has been increasingly agitated and said he had SI statements yesterday. Buspar was started 4 days ago and was very lethargic the day after. Seroquel started during the program mom states which causes increased agitation, irritable. Pt admitted for SI statements he made yesterday. Pt states he has been feeling increased agitation for the past few days with the different mixtures of medications he has been taking. Pt states he has questioned his reason of living and haplessness, no plan. Denies loss of appetite, sleep. Pt sees Dr. Gifford in [facility]. 28 yo male w/ PMHx of high functional autism, anxiety, depression, OCPD ambulatoryto the ED with mother Norma ([phone]) for psychiatric evaluation. Per mom pt was dc last week 05/24 from a facility in [place], pt was there for 43 days. Per mother, pt is currently in a partial hospitalization program started last Thursday and started new medication Buspar and Seroquel while on recently lower dose of Effexor; states her son has been increasingly agitated and said he had SI statements yesterday. Buspar was started 4 days ago and was very lethargic the day after. Seroquel started during the [place] inpt program mom states which causes increased agitation, irritable. Pt presents today for SI statements he made yesterday. Pt states he has been feeling increased agitation for the past few days with the different mixtures of medications he has been taking, "They are not working.". Pt states he has questioned his reason of living and + sense of despair/helplessness, admits to SI, no plan. Denies loss of appetite, sleep. Pt sees Dr. Gifford in [facility].

## 2023-06-03 NOTE — ED PROVIDER NOTE - PSYCHIATRIC, MLM
Alert and oriented to person, place, time/situation. normal mood and affect. no apparent risk to self or others. Alert and oriented to person, place, time/situation. Mildly depressed affect, + SI, no plan, no HI, no hallucinations.

## 2023-06-08 PROBLEM — F41.9 ANXIETY DISORDER, UNSPECIFIED: Chronic | Status: ACTIVE | Noted: 2023-06-06

## 2023-06-09 NOTE — ED ADULT NURSE NOTE - NS ED NURSE LEVEL OF CONSCIOUSNESS ORIENTATION
constant left sided chest pain radiating to left arm , left leg pain "heaviness" x 9am today. pt reports pain worse w/movement. denies sob/dizziness/numbness Oriented - self; Oriented - place; Oriented - time

## 2023-06-12 ENCOUNTER — APPOINTMENT (OUTPATIENT)
Dept: ORTHOPEDIC SURGERY | Facility: CLINIC | Age: 28
End: 2023-06-12
Payer: COMMERCIAL

## 2023-06-12 ENCOUNTER — NON-APPOINTMENT (OUTPATIENT)
Age: 28
End: 2023-06-12

## 2023-06-12 VITALS
HEART RATE: 72 BPM | DIASTOLIC BLOOD PRESSURE: 75 MMHG | HEIGHT: 73 IN | WEIGHT: 200 LBS | SYSTOLIC BLOOD PRESSURE: 115 MMHG | BODY MASS INDEX: 26.51 KG/M2

## 2023-06-12 DIAGNOSIS — M25.552 PAIN IN RIGHT HIP: ICD-10-CM

## 2023-06-12 DIAGNOSIS — M70.60 TROCHANTERIC BURSITIS, UNSPECIFIED HIP: ICD-10-CM

## 2023-06-12 DIAGNOSIS — M25.551 PAIN IN RIGHT HIP: ICD-10-CM

## 2023-06-12 PROCEDURE — 73521 X-RAY EXAM HIPS BI 2 VIEWS: CPT

## 2023-06-12 PROCEDURE — 99204 OFFICE O/P NEW MOD 45 MIN: CPT

## 2023-06-12 RX ORDER — DICLOFENAC SODIUM 20 MG/G
2 SOLUTION TOPICAL 3 TIMES DAILY
Qty: 1 | Refills: 0 | Status: ACTIVE | COMMUNITY
Start: 2023-06-12 | End: 1900-01-01

## 2023-06-12 NOTE — DISCUSSION/SUMMARY
[de-identified] : 27yM pw bilateral trochanteric bursitis without any sx of KRISSY despite radiographic appearance.  The patient was extensively counseled on treatment options including but not limited to observation, rest/activity modification, bracing, anti-inflammatory medications, physical therapy, injections, and surgery.  The natural history of the disease was thoroughly explained.  We discussed that the majority of the time, this condition can be initially treated conservatively.\par The patient will proceed with:\par -PT\par -diclofenac rx\par -pt was instructed on the importance of resting, icing and elevating to minimize swelling\par -RTC 2m, can consider toradol inj\par \par I have personally obtained the history, reviewed the ROS as noted, and performed the physical examination today.  The patient and I discussed the assessment and options and developed the plan.  All questions were answered and the patient stated their understanding of the treatment plan and appreciation of the visit.\par \par My cumulative time spent on this patient's visit included: Preparation for the visit, review of the medical records, review of pertinent diagnostic studies, examination and counseling of the patient on the above diagnosis, treatment plan and prognosis, orders of diagnostic tests, medications and/or appropriate procedures and documentation in the medical records of today's visit. \par \par Nagi Dixon MD

## 2023-06-12 NOTE — PHYSICAL EXAM
[de-identified] : Gen: NAD\par Resp: Nonlabored respirations, no SOB\par Gait: nl\par \par Side: bl\par Skin in tact\par Tenderness: GT\par \par \par Hip ROM \par                              Flexion              Extension               IR               ER\par Left                      120                    10                         30              40\par Normal                  120                    10                         25              40\par \par \par Strength\par                              Flexion              Extension           Abduction        Adduction \par Affected               5                        5                               5                     5                    \par Normal                  5                        5                               5                     5\par \par Provocative Tests:\par Log roll  (-)\par FADIR   (-)\par ABRAHAM   (+)\par Cecil   (+)\par Resisted SLR  (-)\par   [de-identified] : The following radiographs were ordered and read by me during this patient's visit. I reviewed each radiograph in detail with the patient and discussed the findings as highlighted below.   2 views of the L and R hip were obtained today that show no fracture, or dislocation. There are no degenerative changes seen. There is some KRISSY with 58 degree alpha angle. No obvious osseous abnormality. Otherwise unremarkable.

## 2023-06-12 NOTE — HISTORY OF PRESENT ILLNESS
[de-identified] : 27yM pw bl hip pain x1-2m acutely on 5y chronic.  He recently began training for mixed martial arts and does many high kicks.  He feels bilateral, L>R lateral hip pain that is worse with certain motions with associated clicking.  No deep groin pain, no n/p.  6/10 pain intensity\par His mom is a  at "[employer].

## 2023-07-05 ENCOUNTER — APPOINTMENT (OUTPATIENT)
Dept: ORTHOPEDIC SURGERY | Facility: CLINIC | Age: 28
End: 2023-07-05

## 2023-07-07 ENCOUNTER — APPOINTMENT (OUTPATIENT)
Dept: ORTHOPEDIC SURGERY | Facility: CLINIC | Age: 28
End: 2023-07-07

## 2023-07-10 ENCOUNTER — APPOINTMENT (OUTPATIENT)
Dept: PULMONOLOGY | Facility: CLINIC | Age: 28
End: 2023-07-10

## 2023-09-02 ENCOUNTER — EMERGENCY (EMERGENCY)
Facility: HOSPITAL | Age: 28
LOS: 1 days | Discharge: HOME | End: 2023-09-02
Attending: STUDENT IN AN ORGANIZED HEALTH CARE EDUCATION/TRAINING PROGRAM | Admitting: STUDENT IN AN ORGANIZED HEALTH CARE EDUCATION/TRAINING PROGRAM
Payer: COMMERCIAL

## 2023-09-02 VITALS
TEMPERATURE: 98 F | DIASTOLIC BLOOD PRESSURE: 76 MMHG | SYSTOLIC BLOOD PRESSURE: 116 MMHG | OXYGEN SATURATION: 98 % | HEIGHT: 72 IN | RESPIRATION RATE: 18 BRPM | WEIGHT: 197.09 LBS | HEART RATE: 78 BPM

## 2023-09-02 VITALS
OXYGEN SATURATION: 100 % | SYSTOLIC BLOOD PRESSURE: 116 MMHG | DIASTOLIC BLOOD PRESSURE: 82 MMHG | TEMPERATURE: 98 F | RESPIRATION RATE: 16 BRPM | HEART RATE: 75 BPM

## 2023-09-02 LAB
ALBUMIN SERPL ELPH-MCNC: 3.5 G/DL — SIGNIFICANT CHANGE UP (ref 3.3–5)
ALP SERPL-CCNC: 56 U/L — SIGNIFICANT CHANGE UP (ref 40–120)
ALT FLD-CCNC: 31 U/L — SIGNIFICANT CHANGE UP (ref 12–78)
ANION GAP SERPL CALC-SCNC: 3 MMOL/L — LOW (ref 5–17)
APTT BLD: 33.3 SEC — SIGNIFICANT CHANGE UP (ref 24.5–35.6)
AST SERPL-CCNC: 28 U/L — SIGNIFICANT CHANGE UP (ref 15–37)
BASOPHILS # BLD AUTO: 0.01 K/UL — SIGNIFICANT CHANGE UP (ref 0–0.2)
BASOPHILS NFR BLD AUTO: 0.2 % — SIGNIFICANT CHANGE UP (ref 0–2)
BILIRUB SERPL-MCNC: 0.2 MG/DL — SIGNIFICANT CHANGE UP (ref 0.2–1.2)
BUN SERPL-MCNC: 21 MG/DL — SIGNIFICANT CHANGE UP (ref 7–23)
CALCIUM SERPL-MCNC: 8.9 MG/DL — SIGNIFICANT CHANGE UP (ref 8.5–10.1)
CHLORIDE SERPL-SCNC: 105 MMOL/L — SIGNIFICANT CHANGE UP (ref 96–108)
CO2 SERPL-SCNC: 31 MMOL/L — SIGNIFICANT CHANGE UP (ref 22–31)
CREAT SERPL-MCNC: 1 MG/DL — SIGNIFICANT CHANGE UP (ref 0.5–1.3)
EGFR: 107 ML/MIN/1.73M2 — SIGNIFICANT CHANGE UP
EOSINOPHIL # BLD AUTO: 0.04 K/UL — SIGNIFICANT CHANGE UP (ref 0–0.5)
EOSINOPHIL NFR BLD AUTO: 0.6 % — SIGNIFICANT CHANGE UP (ref 0–6)
GLUCOSE SERPL-MCNC: 102 MG/DL — HIGH (ref 70–99)
HCT VFR BLD CALC: 43.1 % — SIGNIFICANT CHANGE UP (ref 39–50)
HGB BLD-MCNC: 14.7 G/DL — SIGNIFICANT CHANGE UP (ref 13–17)
IMM GRANULOCYTES NFR BLD AUTO: 0.3 % — SIGNIFICANT CHANGE UP (ref 0–0.9)
INR BLD: 1.01 RATIO — SIGNIFICANT CHANGE UP (ref 0.85–1.18)
LYMPHOCYTES # BLD AUTO: 2.25 K/UL — SIGNIFICANT CHANGE UP (ref 1–3.3)
LYMPHOCYTES # BLD AUTO: 34.9 % — SIGNIFICANT CHANGE UP (ref 13–44)
MCHC RBC-ENTMCNC: 30.8 PG — SIGNIFICANT CHANGE UP (ref 27–34)
MCHC RBC-ENTMCNC: 34.1 GM/DL — SIGNIFICANT CHANGE UP (ref 32–36)
MCV RBC AUTO: 90.4 FL — SIGNIFICANT CHANGE UP (ref 80–100)
MONOCYTES # BLD AUTO: 0.63 K/UL — SIGNIFICANT CHANGE UP (ref 0–0.9)
MONOCYTES NFR BLD AUTO: 9.8 % — SIGNIFICANT CHANGE UP (ref 2–14)
NEUTROPHILS # BLD AUTO: 3.49 K/UL — SIGNIFICANT CHANGE UP (ref 1.8–7.4)
NEUTROPHILS NFR BLD AUTO: 54.2 % — SIGNIFICANT CHANGE UP (ref 43–77)
NRBC # BLD: 0 /100 WBCS — SIGNIFICANT CHANGE UP (ref 0–0)
PLATELET # BLD AUTO: 187 K/UL — SIGNIFICANT CHANGE UP (ref 150–400)
POTASSIUM SERPL-MCNC: 4.3 MMOL/L — SIGNIFICANT CHANGE UP (ref 3.5–5.3)
POTASSIUM SERPL-SCNC: 4.3 MMOL/L — SIGNIFICANT CHANGE UP (ref 3.5–5.3)
PROT SERPL-MCNC: 6.4 G/DL — SIGNIFICANT CHANGE UP (ref 6–8.3)
PROTHROM AB SERPL-ACNC: 11.8 SEC — SIGNIFICANT CHANGE UP (ref 9.5–13)
RBC # BLD: 4.77 M/UL — SIGNIFICANT CHANGE UP (ref 4.2–5.8)
RBC # FLD: 11.8 % — SIGNIFICANT CHANGE UP (ref 10.3–14.5)
SODIUM SERPL-SCNC: 139 MMOL/L — SIGNIFICANT CHANGE UP (ref 135–145)
WBC # BLD: 6.44 K/UL — SIGNIFICANT CHANGE UP (ref 3.8–10.5)
WBC # FLD AUTO: 6.44 K/UL — SIGNIFICANT CHANGE UP (ref 3.8–10.5)

## 2023-09-02 PROCEDURE — 72125 CT NECK SPINE W/O DYE: CPT | Mod: 26,MA

## 2023-09-02 PROCEDURE — 70498 CT ANGIOGRAPHY NECK: CPT | Mod: MA

## 2023-09-02 PROCEDURE — 96375 TX/PRO/DX INJ NEW DRUG ADDON: CPT | Mod: XU

## 2023-09-02 PROCEDURE — 72125 CT NECK SPINE W/O DYE: CPT | Mod: MA

## 2023-09-02 PROCEDURE — 99284 EMERGENCY DEPT VISIT MOD MDM: CPT | Mod: 25

## 2023-09-02 PROCEDURE — 86753 PROTOZOA ANTIBODY NOS: CPT

## 2023-09-02 PROCEDURE — 70498 CT ANGIOGRAPHY NECK: CPT | Mod: 26,MA

## 2023-09-02 PROCEDURE — 80053 COMPREHEN METABOLIC PANEL: CPT

## 2023-09-02 PROCEDURE — 99285 EMERGENCY DEPT VISIT HI MDM: CPT

## 2023-09-02 PROCEDURE — 96374 THER/PROPH/DIAG INJ IV PUSH: CPT | Mod: XU

## 2023-09-02 PROCEDURE — 86666 EHRLICHIA ANTIBODY: CPT

## 2023-09-02 PROCEDURE — 85025 COMPLETE CBC W/AUTO DIFF WBC: CPT

## 2023-09-02 PROCEDURE — 85610 PROTHROMBIN TIME: CPT

## 2023-09-02 PROCEDURE — 85730 THROMBOPLASTIN TIME PARTIAL: CPT

## 2023-09-02 PROCEDURE — 70496 CT ANGIOGRAPHY HEAD: CPT | Mod: MA

## 2023-09-02 PROCEDURE — 70496 CT ANGIOGRAPHY HEAD: CPT | Mod: 26,MA

## 2023-09-02 PROCEDURE — 36415 COLL VENOUS BLD VENIPUNCTURE: CPT

## 2023-09-02 PROCEDURE — 86618 LYME DISEASE ANTIBODY: CPT

## 2023-09-02 PROCEDURE — 70450 CT HEAD/BRAIN W/O DYE: CPT | Mod: 26,59,MA

## 2023-09-02 PROCEDURE — 70450 CT HEAD/BRAIN W/O DYE: CPT | Mod: MA

## 2023-09-02 RX ORDER — SODIUM CHLORIDE 9 MG/ML
1000 INJECTION INTRAMUSCULAR; INTRAVENOUS; SUBCUTANEOUS ONCE
Refills: 0 | Status: COMPLETED | OUTPATIENT
Start: 2023-09-02 | End: 2023-09-02

## 2023-09-02 RX ORDER — METOCLOPRAMIDE HCL 10 MG
10 TABLET ORAL ONCE
Refills: 0 | Status: DISCONTINUED | OUTPATIENT
Start: 2023-09-02 | End: 2023-09-02

## 2023-09-02 RX ORDER — KETOROLAC TROMETHAMINE 30 MG/ML
30 SYRINGE (ML) INJECTION ONCE
Refills: 0 | Status: DISCONTINUED | OUTPATIENT
Start: 2023-09-02 | End: 2023-09-02

## 2023-09-02 RX ORDER — MECLIZINE HCL 12.5 MG
1 TABLET ORAL
Qty: 12 | Refills: 0
Start: 2023-09-02 | End: 2023-09-05

## 2023-09-02 RX ORDER — MECLIZINE HCL 12.5 MG
25 TABLET ORAL ONCE
Refills: 0 | Status: COMPLETED | OUTPATIENT
Start: 2023-09-02 | End: 2023-09-02

## 2023-09-02 RX ORDER — ACETAMINOPHEN 500 MG
1000 TABLET ORAL ONCE
Refills: 0 | Status: COMPLETED | OUTPATIENT
Start: 2023-09-02 | End: 2023-09-02

## 2023-09-02 RX ADMIN — Medication 400 MILLIGRAM(S): at 18:03

## 2023-09-02 RX ADMIN — Medication 30 MILLIGRAM(S): at 20:52

## 2023-09-02 RX ADMIN — SODIUM CHLORIDE 1000 MILLILITER(S): 9 INJECTION INTRAMUSCULAR; INTRAVENOUS; SUBCUTANEOUS at 19:58

## 2023-09-02 RX ADMIN — SODIUM CHLORIDE 1000 MILLILITER(S): 9 INJECTION INTRAMUSCULAR; INTRAVENOUS; SUBCUTANEOUS at 19:02

## 2023-09-02 RX ADMIN — SODIUM CHLORIDE 1000 MILLILITER(S): 9 INJECTION INTRAMUSCULAR; INTRAVENOUS; SUBCUTANEOUS at 18:02

## 2023-09-02 RX ADMIN — Medication 25 MILLIGRAM(S): at 20:57

## 2023-09-02 NOTE — ED PROVIDER NOTE - OBJECTIVE STATEMENT
Patient with a past medical history of anxiety and depression is presenting here for headaches.  Headache has been intermittent over the past few days but worsened today when at the beach.  States he feels the pain more when he is leaning over and then picks his head up.  Has associated dizziness that is worse when he looks from side to side.  Headache was not sudden in onset.  Denies any nausea or vomiting.  No fever or neck stiffness.  Denies any prior history of headaches.

## 2023-09-02 NOTE — ED ADULT NURSE REASSESSMENT NOTE - NS ED NURSE REASSESS COMMENT FT1
Pt received from RENE Cooley. Pt is Aox4 mom at bedside. Pt states his headache is better and his dizziness is going away. Pt did not want to take the meclizine. IVF are running, will reassess.

## 2023-09-02 NOTE — ED PROVIDER NOTE - CHIEF COMPLAINT
The patient is a 27y Male complaining of headache.
See Dr Shepherd in 2 weeks; Call for an appointment; Bring a new Chest X-ray with you.  Keep wounds clean and dry;

## 2023-09-02 NOTE — ED PROVIDER NOTE - CLINICAL SUMMARY MEDICAL DECISION MAKING FREE TEXT BOX
I, Ronnie Melgar MD, have seen and examined the patient on the date of service.  I agree with the BRANDY's assessment as written, with exceptions or additions as noted below or in a separate note. pt with pmh of anxiety/depression is here for headache. has been intermittent over past few days but worsened when getting to beach today. worse when leaning over and picking head up. associated with dizziness. no n/v. no sudden onset symptoms. no neck stiffness or fevers. headache worsened today so came to ed. no reported prior hx of headaches. on exam CN 2-12 in tact. No facial droop. Normal finger to nose. Negative pronator drift. Normal heel to shin. 5/5 strength in bilateral upper and lower extremities. Sensation in tact to light touch in bilateral upper and lower extremities. a/P: possible primary headache such as migraine/tension headache. with positional changes, consider vertigo or dehydration causing headache. no daily morning headaches to suggest space occupying lesion. no fever/neck stiffness to suggest meningitis. not sudden onset to suggest sah. plan on labs/meds. family very concerned about intracranial pathology and requesting ct scan. will image and reassess.

## 2023-09-02 NOTE — ED ADULT NURSE NOTE - NS ED PATIENT SAFETY CONCERN
Final Anesthesia Post-op Assessment    Patient: Laquita Betancourt  Procedure(s) Performed: COLONOSCOPY  Anesthesia type: MAC    Vitals Value Taken Time   Temp >36 09/24/20 1551   Pulse 75 09/24/20 1551   Resp 14 09/24/20 1551   SpO2 100 09/24/20 1551   /75 09/24/20 1551         Patient Location: bedside  Post-op Vital Signs:stable  Level of Consciousness: participates in exam, awake, alert and oriented  Respiratory Status: spontaneous ventilation  Cardiovascular blood pressure returned to baseline  Hydration: unable to assess  Pain Management: well controlled  Handoff: Handoff to receiving clinician was performed and questions were answered  Vomiting: none   Nausea: None  Airway Patency:patent  Post-op Assessment: awake, alert, appropriately conversant, or baseline, no complications, patient tolerated procedure well with no complications, evidence of recall, dentition within defined limits and moving all extremities      No complications documented.    No

## 2023-09-02 NOTE — ED ADULT NURSE NOTE - OBJECTIVE STATEMENT
Patient is 28yo M BIB mother with c/o "migraine headache" since Wednesday. Patient reports on Wednesday he developed frontal headache and pain in B/L eyes, went to an ophthalmologist who stated his eye exam was normal but that he should he a neuro ophthalmologist, but if his symptoms get worse to go the ER. Patient denies worsening symptoms but reports headache returned today while he was [place] the beach, states he has some sensitivity to light, states when he bends over he has worse frontal headache and neck pain, now pain all over head. Patient reports PMHx bipolar and vertigo. Patient denies fevers, chills, nausea, vomiting.

## 2023-09-02 NOTE — ED ADULT NURSE NOTE - NEURO GAIT
-call light within reach. Patient has not complained of pain during shift.  Problem: Adult Inpatient Plan of Care  Goal: Plan of Care Review  Outcome: Ongoing, Progressing  Goal: Patient-Specific Goal (Individualized)  Outcome: Ongoing, Progressing  Goal: Absence of Hospital-Acquired Illness or Injury  Outcome: Ongoing, Progressing  Goal: Optimal Comfort and Wellbeing  Outcome: Ongoing, Progressing  Goal: Readiness for Transition of Care  Outcome: Ongoing, Progressing     Problem: Diabetes Comorbidity  Goal: Blood Glucose Level Within Targeted Range  Outcome: Ongoing, Progressing     Problem: Fall Injury Risk  Goal: Absence of Fall and Fall-Related Injury  Outcome: Ongoing, Progressing      steady

## 2023-09-02 NOTE — ED PROVIDER NOTE - NSFOLLOWUPINSTRUCTIONS_ED_ALL_ED_FT
Follow-up with neurologist for reevaluation, ongoing care and treatment.  Rest, drink plenty of fluids, take Tylenol or Motrin as directed for pain.  Take meclizine as prescribed for dizziness/vertigo.  If having worsening of symptoms, fever, vomiting or other related symptoms, return to the ER immediately.    A headache is pain or discomfort felt around the head or neck area. There are many causes and types of headaches. A few common types include:  Tension headaches.  Migraine headaches.  Cluster headaches.  Chronic daily headaches.  Sometimes, the specific cause of a headache may not be found.    Follow these instructions at home:  Watch your condition for any changes. Let your health care provider know about them. Take these steps to help with your condition:    Managing pain    A bag of ice on a towel on the skin.   A heating pad for use on the painful area.   Take over-the-counter and prescription medicines only as told by your health care provider. Treatment may include medicines for pain that are taken by mouth or applied to the skin.  Lie down in a dark, quiet room when you have a headache.  Keep lights dim if bright lights bother you or make your headaches worse.  If directed, put ice on your head and neck area:  Put ice in a plastic bag.  Place a towel between your skin and the bag.  Leave the ice on for 20 minutes, 2–3 times per day.  Remove the ice if your skin turns bright red. This is very important. If you cannot feel pain, heat, or cold, you have a greater risk of damage to the area.  If directed, apply heat to the affected area. Use the heat source that your health care provider recommends, such as a moist heat pack or a heating pad.  Place a towel between your skin and the heat source.  Leave the heat on for 20–30 minutes.  Remove the heat if your skin turns bright red. This is especially important if you are unable to feel pain, heat, or cold. You have a greater risk of getting burned.  Eating and drinking    Eat meals on a regular schedule.  If you drink alcohol:  Limit how much you have to:  0–1 drink a day for women who are not pregnant.  0–2 drinks a day for men.  Know how much alcohol is in a drink. In the [place] one drink equals one 12 oz bottle of beer (355 mL), one 5 oz glass of wine (148 mL), or one 1½ oz glass of hard liquor (44 mL).  Stop drinking caffeine, or decrease the amount of caffeine you drink.  Drink enough fluid to keep your urine pale yellow.  General instructions    A person writing in a journal.   Keep a headache journal to help find out what may trigger your headaches. For example, write down:  What you eat and drink.  How much sleep you get.  Any change to your diet or medicines.  Try massage or other relaxation techniques.  Limit stress.  Sit up straight, and do not tense your muscles.  Do not use any products that contain nicotine or tobacco. These products include cigarettes, chewing tobacco, and vaping devices, such as e-cigarettes. If you need help quitting, ask your health care provider.  Exercise regularly as told by your health care provider.  Sleep on a regular schedule. Get 7–9 hours of sleep each night, or the amount recommended by your health care provider.  Keep all follow-up visits. This is important.  Contact a health care provider if:  Medicine does not help your symptoms.  You have a headache that is different from your usual headache.  You have nausea or you vomit.  You have a fever.  Get help right away if:  Your headache:  Becomes severe quickly.  Gets worse after moderate to intense physical activity.  You have any of these symptoms:  Repeated vomiting.  Pain or stiffness in your neck.  Changes to your vision.  Pain in an eye or ear.  Problems with speech.  Muscular weakness or loss of muscle control.  Loss of balance or coordination.  You feel faint or pass out.  You have confusion.  You have a seizure.  These symptoms may represent a serious problem that is an emergency. Do not wait to see if the symptoms will go away. Get medical help right away. Call your local emergency services (911 in the [place] Do not drive yourself to the hospital.    Summary  A headache is pain or discomfort felt around the head or neck area.  There are many causes and types of headaches. In some cases, the cause may not be found.  Keep a headache journal to help find out what may trigger your headaches. Watch your condition for any changes. Let your health care provider know about them.  Contact a health care provider if you have a headache that is different from the usual headache, or if your symptoms are not helped by medicine.  Get help right away if your headache becomes severe, you vomit, you have a loss of vision, you lose your balance, or you have a seizure.  This information is not intended to replace advice given to you by your health care provider. Make sure you discuss any questions you have with your health care provider.

## 2023-09-02 NOTE — ED PROVIDER NOTE - PATIENT PORTAL LINK FT
You can access the FollowMyHealth Patient Portal offered by [facility] by registering at the following website: [email or web address]. By joining [facility]s FollowMyHealth portal, you will also be able to view your health information using other applications (apps) compatible with our system.

## 2023-09-02 NOTE — ED PROVIDER NOTE - PROGRESS NOTE DETAILS
Reevaluated patient at bedside.  Patient feeling much improved.  Discussed the results of all diagnostic testing in ED and copies of all reports given.   An opportunity to ask questions was given.  Discussed the importance of prompt, close medical follow-up.  Patient will return with any changes, concerns or persistent / worsening symptoms.  Understanding of all instructions verbalized. Reevaluated patient at bedside.  Patient feeling much improved.  Discussed the results of all diagnostic testing in ED and copies of all reports given. Advised to take otc motrin or tylenol prn pain, will rx meclizine, f.u neuro. An opportunity to ask questions was given.  Discussed the importance of prompt, close medical follow-up.  Patient will return with any changes, concerns or persistent / worsening symptoms.  Understanding of all instructions verbalized.

## 2023-09-02 NOTE — ED ADULT NURSE NOTE - NSFALLUNIVINTERV_ED_ALL_ED
Bed/Stretcher in lowest position, wheels locked, appropriate side rails in place/Call bell, personal items and telephone in reach/Instruct patient to call for assistance before getting out of bed/chair/stretcher/Non-slip footwear applied when patient is off stretcher/[place] to call system/Physically safe environment - no spills, clutter or unnecessary equipment/Purposeful proactive rounding/Room/bathroom lighting operational, light cord in reach

## 2023-09-02 NOTE — ED PROVIDER NOTE - CARE PROVIDER_API CALL
Jose Smith  Neurology  [address] [place] [address]  Phone: [phone]  Fax: [phone]  Follow Up Time: 1-3 Days

## 2023-09-06 LAB
A PHAGOCYTOPH IGG TITR SER IF: SIGNIFICANT CHANGE UP TITER
B BURGDOR AB SER QL IA: NEGATIVE — SIGNIFICANT CHANGE UP
B MICROTI IGG TITR SER: SIGNIFICANT CHANGE UP TITER
E CHAFFEENSIS IGG TITR SER IF: SIGNIFICANT CHANGE UP TITER

## 2023-09-14 ENCOUNTER — APPOINTMENT (OUTPATIENT)
Dept: PAIN MANAGEMENT | Facility: CLINIC | Age: 28
End: 2023-09-14
Payer: COMMERCIAL

## 2023-09-14 VITALS
SYSTOLIC BLOOD PRESSURE: 125 MMHG | HEART RATE: 64 BPM | HEIGHT: 73 IN | WEIGHT: 200 LBS | DIASTOLIC BLOOD PRESSURE: 79 MMHG | BODY MASS INDEX: 26.51 KG/M2

## 2023-09-14 DIAGNOSIS — G43.701 CHRONIC MIGRAINE W/OUT AURA, NOT INTRACTABLE, WITH STATUS MIGRAINOSUS: ICD-10-CM

## 2023-09-14 PROCEDURE — 99205 OFFICE O/P NEW HI 60 MIN: CPT

## 2023-09-19 ENCOUNTER — TRANSCRIPTION ENCOUNTER (OUTPATIENT)
Age: 28
End: 2023-09-19

## 2023-09-27 RX ORDER — RIZATRIPTAN BENZOATE 10 MG/1
10 TABLET ORAL
Qty: 27 | Refills: 2 | Status: ACTIVE | COMMUNITY
Start: 2023-09-14 | End: 1900-01-01

## 2023-10-23 ENCOUNTER — APPOINTMENT (OUTPATIENT)
Dept: NEUROLOGY | Facility: CLINIC | Age: 28
End: 2023-10-23

## 2023-11-16 ENCOUNTER — APPOINTMENT (OUTPATIENT)
Dept: MRI IMAGING | Facility: CLINIC | Age: 28
End: 2023-11-16
Payer: COMMERCIAL

## 2023-11-16 ENCOUNTER — OUTPATIENT (OUTPATIENT)
Dept: OUTPATIENT SERVICES | Facility: HOSPITAL | Age: 28
LOS: 1 days | End: 2023-11-16
Payer: COMMERCIAL

## 2023-11-16 DIAGNOSIS — G43.109 MIGRAINE WITH AURA, NOT INTRACTABLE, WITHOUT STATUS MIGRAINOSUS: ICD-10-CM

## 2023-11-16 DIAGNOSIS — Z00.8 ENCOUNTER FOR OTHER GENERAL EXAMINATION: ICD-10-CM

## 2023-11-16 PROCEDURE — 70551 MRI BRAIN STEM W/O DYE: CPT | Mod: 26

## 2023-11-16 PROCEDURE — 70551 MRI BRAIN STEM W/O DYE: CPT

## 2023-11-20 ENCOUNTER — APPOINTMENT (OUTPATIENT)
Dept: PAIN MANAGEMENT | Facility: CLINIC | Age: 28
End: 2023-11-20
Payer: COMMERCIAL

## 2023-11-20 PROCEDURE — 99212 OFFICE O/P EST SF 10 MIN: CPT | Mod: 95

## 2023-11-21 ENCOUNTER — APPOINTMENT (OUTPATIENT)
Dept: ORTHOPEDIC SURGERY | Facility: CLINIC | Age: 28
End: 2023-11-21

## 2023-11-21 RX ORDER — UBROGEPANT 100 MG/1
100 TABLET ORAL DAILY
Qty: 16 | Refills: 5 | Status: ACTIVE | COMMUNITY
Start: 2023-11-20 | End: 1900-01-01

## 2023-11-27 ENCOUNTER — APPOINTMENT (OUTPATIENT)
Dept: PSYCHIATRY | Facility: CLINIC | Age: 28
End: 2023-11-27
Payer: COMMERCIAL

## 2023-11-27 PROCEDURE — 90791 PSYCH DIAGNOSTIC EVALUATION: CPT | Mod: 95

## 2023-12-05 ENCOUNTER — APPOINTMENT (OUTPATIENT)
Dept: PSYCHIATRY | Facility: CLINIC | Age: 28
End: 2023-12-05
Payer: COMMERCIAL

## 2023-12-05 PROCEDURE — 96137 PSYCL/NRPSYC TST PHY/QHP EA: CPT

## 2023-12-05 PROCEDURE — ZZZZZ: CPT

## 2023-12-05 PROCEDURE — 96133 NRPSYC TST EVAL PHYS/QHP EA: CPT

## 2023-12-05 PROCEDURE — 96136 PSYCL/NRPSYC TST PHY/QHP 1ST: CPT

## 2023-12-05 PROCEDURE — 96132 NRPSYC TST EVAL PHYS/QHP 1ST: CPT

## 2023-12-27 ENCOUNTER — INPATIENT (INPATIENT)
Facility: HOSPITAL | Age: 28
LOS: 1 days | Discharge: HOME | DRG: 603 | End: 2023-12-29
Attending: FAMILY MEDICINE | Admitting: FAMILY MEDICINE
Payer: COMMERCIAL

## 2023-12-27 VITALS
WEIGHT: 214.95 LBS | TEMPERATURE: 98 F | RESPIRATION RATE: 15 BRPM | HEIGHT: 73 IN | SYSTOLIC BLOOD PRESSURE: 114 MMHG | DIASTOLIC BLOOD PRESSURE: 74 MMHG | OXYGEN SATURATION: 99 % | HEART RATE: 89 BPM

## 2023-12-27 DIAGNOSIS — L03.011 CELLULITIS OF RIGHT FINGER: ICD-10-CM

## 2023-12-27 DIAGNOSIS — F31.9 BIPOLAR DISORDER, UNSPECIFIED: ICD-10-CM

## 2023-12-27 DIAGNOSIS — L03.113 CELLULITIS OF RIGHT UPPER LIMB: ICD-10-CM

## 2023-12-27 LAB
ALBUMIN SERPL ELPH-MCNC: 3.9 G/DL — SIGNIFICANT CHANGE UP (ref 3.3–5)
ALBUMIN SERPL ELPH-MCNC: 3.9 G/DL — SIGNIFICANT CHANGE UP (ref 3.3–5)
ALP SERPL-CCNC: 43 U/L — SIGNIFICANT CHANGE UP (ref 30–120)
ALP SERPL-CCNC: 43 U/L — SIGNIFICANT CHANGE UP (ref 30–120)
ALT FLD-CCNC: 34 U/L — SIGNIFICANT CHANGE UP (ref 10–60)
ALT FLD-CCNC: 34 U/L — SIGNIFICANT CHANGE UP (ref 10–60)
ANION GAP SERPL CALC-SCNC: 10 MMOL/L — SIGNIFICANT CHANGE UP (ref 5–17)
ANION GAP SERPL CALC-SCNC: 10 MMOL/L — SIGNIFICANT CHANGE UP (ref 5–17)
APTT BLD: 30.2 SEC — SIGNIFICANT CHANGE UP (ref 24.5–35.6)
APTT BLD: 30.2 SEC — SIGNIFICANT CHANGE UP (ref 24.5–35.6)
AST SERPL-CCNC: 30 U/L — SIGNIFICANT CHANGE UP (ref 10–40)
AST SERPL-CCNC: 30 U/L — SIGNIFICANT CHANGE UP (ref 10–40)
BASOPHILS # BLD AUTO: 0.02 K/UL — SIGNIFICANT CHANGE UP (ref 0–0.2)
BASOPHILS # BLD AUTO: 0.02 K/UL — SIGNIFICANT CHANGE UP (ref 0–0.2)
BASOPHILS NFR BLD AUTO: 0.2 % — SIGNIFICANT CHANGE UP (ref 0–2)
BASOPHILS NFR BLD AUTO: 0.2 % — SIGNIFICANT CHANGE UP (ref 0–2)
BILIRUB SERPL-MCNC: 0.2 MG/DL — SIGNIFICANT CHANGE UP (ref 0.2–1.2)
BILIRUB SERPL-MCNC: 0.2 MG/DL — SIGNIFICANT CHANGE UP (ref 0.2–1.2)
BUN SERPL-MCNC: 17 MG/DL — SIGNIFICANT CHANGE UP (ref 7–23)
BUN SERPL-MCNC: 17 MG/DL — SIGNIFICANT CHANGE UP (ref 7–23)
CALCIUM SERPL-MCNC: 9.5 MG/DL — SIGNIFICANT CHANGE UP (ref 8.4–10.5)
CALCIUM SERPL-MCNC: 9.5 MG/DL — SIGNIFICANT CHANGE UP (ref 8.4–10.5)
CHLORIDE SERPL-SCNC: 103 MMOL/L — SIGNIFICANT CHANGE UP (ref 96–108)
CHLORIDE SERPL-SCNC: 103 MMOL/L — SIGNIFICANT CHANGE UP (ref 96–108)
CO2 SERPL-SCNC: 28 MMOL/L — SIGNIFICANT CHANGE UP (ref 22–31)
CO2 SERPL-SCNC: 28 MMOL/L — SIGNIFICANT CHANGE UP (ref 22–31)
CREAT SERPL-MCNC: 1.05 MG/DL — SIGNIFICANT CHANGE UP (ref 0.5–1.3)
CREAT SERPL-MCNC: 1.05 MG/DL — SIGNIFICANT CHANGE UP (ref 0.5–1.3)
EGFR: 100 ML/MIN/1.73M2 — SIGNIFICANT CHANGE UP
EGFR: 100 ML/MIN/1.73M2 — SIGNIFICANT CHANGE UP
EOSINOPHIL # BLD AUTO: 0.39 K/UL — SIGNIFICANT CHANGE UP (ref 0–0.5)
EOSINOPHIL # BLD AUTO: 0.39 K/UL — SIGNIFICANT CHANGE UP (ref 0–0.5)
EOSINOPHIL NFR BLD AUTO: 3.7 % — SIGNIFICANT CHANGE UP (ref 0–6)
EOSINOPHIL NFR BLD AUTO: 3.7 % — SIGNIFICANT CHANGE UP (ref 0–6)
GLUCOSE SERPL-MCNC: 89 MG/DL — SIGNIFICANT CHANGE UP (ref 70–99)
GLUCOSE SERPL-MCNC: 89 MG/DL — SIGNIFICANT CHANGE UP (ref 70–99)
HCT VFR BLD CALC: 45 % — SIGNIFICANT CHANGE UP (ref 39–50)
HCT VFR BLD CALC: 45 % — SIGNIFICANT CHANGE UP (ref 39–50)
HGB BLD-MCNC: 15.2 G/DL — SIGNIFICANT CHANGE UP (ref 13–17)
HGB BLD-MCNC: 15.2 G/DL — SIGNIFICANT CHANGE UP (ref 13–17)
IMM GRANULOCYTES NFR BLD AUTO: 0.2 % — SIGNIFICANT CHANGE UP (ref 0–0.9)
IMM GRANULOCYTES NFR BLD AUTO: 0.2 % — SIGNIFICANT CHANGE UP (ref 0–0.9)
INR BLD: 1.07 RATIO — SIGNIFICANT CHANGE UP (ref 0.85–1.18)
INR BLD: 1.07 RATIO — SIGNIFICANT CHANGE UP (ref 0.85–1.18)
LACTATE SERPL-SCNC: 1.5 MMOL/L — SIGNIFICANT CHANGE UP (ref 0.7–2)
LACTATE SERPL-SCNC: 1.5 MMOL/L — SIGNIFICANT CHANGE UP (ref 0.7–2)
LYMPHOCYTES # BLD AUTO: 2.27 K/UL — SIGNIFICANT CHANGE UP (ref 1–3.3)
LYMPHOCYTES # BLD AUTO: 2.27 K/UL — SIGNIFICANT CHANGE UP (ref 1–3.3)
LYMPHOCYTES # BLD AUTO: 21.5 % — SIGNIFICANT CHANGE UP (ref 13–44)
LYMPHOCYTES # BLD AUTO: 21.5 % — SIGNIFICANT CHANGE UP (ref 13–44)
MCHC RBC-ENTMCNC: 30.6 PG — SIGNIFICANT CHANGE UP (ref 27–34)
MCHC RBC-ENTMCNC: 30.6 PG — SIGNIFICANT CHANGE UP (ref 27–34)
MCHC RBC-ENTMCNC: 33.8 GM/DL — SIGNIFICANT CHANGE UP (ref 32–36)
MCHC RBC-ENTMCNC: 33.8 GM/DL — SIGNIFICANT CHANGE UP (ref 32–36)
MCV RBC AUTO: 90.7 FL — SIGNIFICANT CHANGE UP (ref 80–100)
MCV RBC AUTO: 90.7 FL — SIGNIFICANT CHANGE UP (ref 80–100)
MONOCYTES # BLD AUTO: 1.19 K/UL — HIGH (ref 0–0.9)
MONOCYTES # BLD AUTO: 1.19 K/UL — HIGH (ref 0–0.9)
MONOCYTES NFR BLD AUTO: 11.3 % — SIGNIFICANT CHANGE UP (ref 2–14)
MONOCYTES NFR BLD AUTO: 11.3 % — SIGNIFICANT CHANGE UP (ref 2–14)
NEUTROPHILS # BLD AUTO: 6.67 K/UL — SIGNIFICANT CHANGE UP (ref 1.8–7.4)
NEUTROPHILS # BLD AUTO: 6.67 K/UL — SIGNIFICANT CHANGE UP (ref 1.8–7.4)
NEUTROPHILS NFR BLD AUTO: 63.1 % — SIGNIFICANT CHANGE UP (ref 43–77)
NEUTROPHILS NFR BLD AUTO: 63.1 % — SIGNIFICANT CHANGE UP (ref 43–77)
NRBC # BLD: 0 /100 WBCS — SIGNIFICANT CHANGE UP (ref 0–0)
NRBC # BLD: 0 /100 WBCS — SIGNIFICANT CHANGE UP (ref 0–0)
PLATELET # BLD AUTO: 243 K/UL — SIGNIFICANT CHANGE UP (ref 150–400)
PLATELET # BLD AUTO: 243 K/UL — SIGNIFICANT CHANGE UP (ref 150–400)
POTASSIUM SERPL-MCNC: 4.1 MMOL/L — SIGNIFICANT CHANGE UP (ref 3.5–5.3)
POTASSIUM SERPL-MCNC: 4.1 MMOL/L — SIGNIFICANT CHANGE UP (ref 3.5–5.3)
POTASSIUM SERPL-SCNC: 4.1 MMOL/L — SIGNIFICANT CHANGE UP (ref 3.5–5.3)
POTASSIUM SERPL-SCNC: 4.1 MMOL/L — SIGNIFICANT CHANGE UP (ref 3.5–5.3)
PROT SERPL-MCNC: 6.6 G/DL — SIGNIFICANT CHANGE UP (ref 6–8.3)
PROT SERPL-MCNC: 6.6 G/DL — SIGNIFICANT CHANGE UP (ref 6–8.3)
PROTHROM AB SERPL-ACNC: 11.6 SEC — SIGNIFICANT CHANGE UP (ref 9.5–13)
PROTHROM AB SERPL-ACNC: 11.6 SEC — SIGNIFICANT CHANGE UP (ref 9.5–13)
RBC # BLD: 4.96 M/UL — SIGNIFICANT CHANGE UP (ref 4.2–5.8)
RBC # BLD: 4.96 M/UL — SIGNIFICANT CHANGE UP (ref 4.2–5.8)
RBC # FLD: 11.6 % — SIGNIFICANT CHANGE UP (ref 10.3–14.5)
RBC # FLD: 11.6 % — SIGNIFICANT CHANGE UP (ref 10.3–14.5)
SODIUM SERPL-SCNC: 141 MMOL/L — SIGNIFICANT CHANGE UP (ref 135–145)
SODIUM SERPL-SCNC: 141 MMOL/L — SIGNIFICANT CHANGE UP (ref 135–145)
WBC # BLD: 10.56 K/UL — HIGH (ref 3.8–10.5)
WBC # BLD: 10.56 K/UL — HIGH (ref 3.8–10.5)
WBC # FLD AUTO: 10.56 K/UL — HIGH (ref 3.8–10.5)
WBC # FLD AUTO: 10.56 K/UL — HIGH (ref 3.8–10.5)

## 2023-12-27 PROCEDURE — 99285 EMERGENCY DEPT VISIT HI MDM: CPT

## 2023-12-27 RX ORDER — LANOLIN ALCOHOL/MO/W.PET/CERES
3 CREAM (GRAM) TOPICAL AT BEDTIME
Refills: 0 | Status: DISCONTINUED | OUTPATIENT
Start: 2023-12-27 | End: 2023-12-29

## 2023-12-27 RX ORDER — DIVALPROEX SODIUM 500 MG/1
1250 TABLET, DELAYED RELEASE ORAL
Refills: 0 | Status: DISCONTINUED | OUTPATIENT
Start: 2023-12-27 | End: 2023-12-29

## 2023-12-27 RX ORDER — CEFEPIME 1 G/1
2000 INJECTION, POWDER, FOR SOLUTION INTRAMUSCULAR; INTRAVENOUS EVERY 12 HOURS
Refills: 0 | Status: DISCONTINUED | OUTPATIENT
Start: 2023-12-28 | End: 2023-12-29

## 2023-12-27 RX ORDER — BUPROPION HYDROCHLORIDE 150 MG/1
0 TABLET, EXTENDED RELEASE ORAL
Qty: 0 | Refills: 0 | DISCHARGE

## 2023-12-27 RX ORDER — LITHIUM CARBONATE 300 MG/1
1 TABLET, EXTENDED RELEASE ORAL
Refills: 0 | DISCHARGE

## 2023-12-27 RX ORDER — OMEGA-3 ACID ETHYL ESTERS 1 G
0 CAPSULE ORAL
Qty: 0 | Refills: 0 | DISCHARGE

## 2023-12-27 RX ORDER — SODIUM CHLORIDE 9 MG/ML
1000 INJECTION INTRAMUSCULAR; INTRAVENOUS; SUBCUTANEOUS ONCE
Refills: 0 | Status: COMPLETED | OUTPATIENT
Start: 2023-12-27 | End: 2023-12-27

## 2023-12-27 RX ORDER — VENLAFAXINE HCL 75 MG
37.5 CAPSULE, EXT RELEASE 24 HR ORAL DAILY
Refills: 0 | Status: DISCONTINUED | OUTPATIENT
Start: 2023-12-27 | End: 2023-12-29

## 2023-12-27 RX ORDER — VENLAFAXINE HCL 75 MG
1 CAPSULE, EXT RELEASE 24 HR ORAL
Refills: 0 | DISCHARGE

## 2023-12-27 RX ORDER — LITHIUM CARBONATE 300 MG/1
750 TABLET, EXTENDED RELEASE ORAL
Refills: 0 | Status: DISCONTINUED | OUTPATIENT
Start: 2023-12-27 | End: 2023-12-29

## 2023-12-27 RX ORDER — ESCITALOPRAM OXALATE 10 MG/1
30 TABLET, FILM COATED ORAL
Qty: 0 | Refills: 0 | DISCHARGE

## 2023-12-27 RX ORDER — ONDANSETRON 8 MG/1
4 TABLET, FILM COATED ORAL EVERY 6 HOURS
Refills: 0 | Status: DISCONTINUED | OUTPATIENT
Start: 2023-12-27 | End: 2023-12-29

## 2023-12-27 RX ORDER — DIVALPROEX SODIUM 500 MG/1
5 TABLET, DELAYED RELEASE ORAL
Refills: 0 | DISCHARGE

## 2023-12-27 RX ORDER — LISDEXAMFETAMINE DIMESYLATE 70 MG/1
1 CAPSULE ORAL
Qty: 0 | Refills: 0 | DISCHARGE

## 2023-12-27 RX ORDER — ACETAMINOPHEN 500 MG
650 TABLET ORAL EVERY 6 HOURS
Refills: 0 | Status: DISCONTINUED | OUTPATIENT
Start: 2023-12-27 | End: 2023-12-29

## 2023-12-27 RX ORDER — CEFEPIME 1 G/1
INJECTION, POWDER, FOR SOLUTION INTRAMUSCULAR; INTRAVENOUS
Refills: 0 | Status: DISCONTINUED | OUTPATIENT
Start: 2023-12-27 | End: 2023-12-29

## 2023-12-27 RX ORDER — CEFEPIME 1 G/1
2000 INJECTION, POWDER, FOR SOLUTION INTRAMUSCULAR; INTRAVENOUS ONCE
Refills: 0 | Status: COMPLETED | OUTPATIENT
Start: 2023-12-27 | End: 2023-12-27

## 2023-12-27 RX ADMIN — LITHIUM CARBONATE 750 MILLIGRAM(S): 300 TABLET, EXTENDED RELEASE ORAL at 20:49

## 2023-12-27 RX ADMIN — SODIUM CHLORIDE 1000 MILLILITER(S): 9 INJECTION INTRAMUSCULAR; INTRAVENOUS; SUBCUTANEOUS at 18:37

## 2023-12-27 RX ADMIN — SODIUM CHLORIDE 1000 MILLILITER(S): 9 INJECTION INTRAMUSCULAR; INTRAVENOUS; SUBCUTANEOUS at 19:36

## 2023-12-27 RX ADMIN — CEFEPIME 100 MILLIGRAM(S): 1 INJECTION, POWDER, FOR SOLUTION INTRAMUSCULAR; INTRAVENOUS at 20:53

## 2023-12-27 RX ADMIN — Medication 100 MILLIGRAM(S): at 18:37

## 2023-12-27 RX ADMIN — Medication 900 MILLIGRAM(S): at 19:34

## 2023-12-27 RX ADMIN — DIVALPROEX SODIUM 1250 MILLIGRAM(S): 500 TABLET, DELAYED RELEASE ORAL at 21:07

## 2023-12-27 NOTE — ED PROVIDER NOTE - PHYSICAL EXAMINATION
Right hand with small paronychia on the medial aspect of the right thumb nail, radial aspect.  Some erythema to the hand, extending from the thumb proximally to the anterior elbow.  Full range of motion of all joints without pain.  Normal cap refill.  Normal distal strength and sensation equal bilaterally.  Normal 2+pulses.

## 2023-12-27 NOTE — H&P ADULT - PROBLEM SELECTOR PLAN 1
Admit  Patient with right thumb cellulitis which failed outpatient po antibiotic therapy. Now erythema spreading up the right hand and forearm  Patient had paronychia of the medial aspect of the right thumb which was I&D'd by ER --> small amount of pus drained. Cultures sent.  Start iv Cefepime 2g q12h  Check ESR, CRP, procalcitonin  Trend WBC  Blood cultures x 2  F/U culture data  Elevate RUE  ID consult Dr. Coon, et. al.  Hand consult Dr. [place]  Further work-up/management pending clinical course.

## 2023-12-27 NOTE — ED ADULT NURSE NOTE - NSFALLUNIVINTERV_ED_ALL_ED
Bed/Stretcher in lowest position, wheels locked, appropriate side rails in place/Call bell, personal items and telephone in reach/Instruct patient to call for assistance before getting out of bed/chair/stretcher/Non-slip footwear applied when patient is off stretcher/[place] to call system/Physically safe environment - no spills, clutter or unnecessary equipment/Purposeful proactive rounding/Room/bathroom lighting operational, light cord in reach Bed/Stretcher in lowest position, wheels locked, appropriate side rails in place/Call bell, personal items and telephone in reach/Instruct patient to call for assistance before getting out of bed/chair/stretcher/Non-slip footwear applied when patient is off stretcher/[place] to call system/Physically safe environment - no spills, clutter or unnecessary equipment/Purposeful proactive rounding/Room/bathroom lighting operational, light cord in reach

## 2023-12-27 NOTE — H&P ADULT - HISTORY OF PRESENT ILLNESS
This is a 28-year-old male with a history of bipolar disorder presents with right thumb infection over past 4 to 5 days.  Patient has been on p.o. doxycycline over past 4 days. Today patient noticed redness in the hand and spreading up the forearm. He also has increased discomfort to the thumb. Patient reportedly sometimes picks at finger nails. He also went bowling last weekend which may have aggravated the symptoms. No fever or chills noted.  No numbness or tingling.  No focal weakness.  No alleviating factors.  No other acute injury or complaints.

## 2023-12-27 NOTE — ED PROVIDER NOTE - PROGRESS NOTE DETAILS
Discussed case with Dr. [place], on-call for hand surgeon.  He states I should open the paronychia, can admit to medicine for IV antibiotics.  He can be reconsulted as needed Discussed with Dr. PEDRO LUIS Butterfield, accepts admission to Dr. Larios.

## 2023-12-27 NOTE — PATIENT PROFILE ADULT - FUNCTIONAL ASSESSMENT - DAILY ACTIVITY 1.
Daughter reports patient has been acting confused for the past 3 weeks. Reports Tuesday her mom kicked her out of the house and changed the locks. Reports a recent change in medication that may be contributing to the confusion. The patient is not present currently. I have advised the daughter call 911 for further evaluation.    Reason for Disposition   [1] Difficult to awaken or acting confused (e.g., disoriented, slurred speech) AND [2] present now AND [3] new onset    Protocols used: CONFUSION - DELIRIUM-A-AH       4 = No assist / stand by assistance

## 2023-12-27 NOTE — ED ADULT NURSE REASSESSMENT NOTE - NSFALLUNIVINTERV_ED_ALL_ED
Bed/Stretcher in lowest position, wheels locked, appropriate side rails in place/Call bell, personal items and telephone in reach/Instruct patient to call for assistance before getting out of bed/chair/stretcher/Non-slip footwear applied when patient is off stretcher/[place] to call system/Physically safe environment - no spills, clutter or unnecessary equipment/Purposeful proactive rounding/Room/bathroom lighting operational, light cord in reach Bed/Stretcher in lowest position, wheels locked, appropriate side rails in place/Call bell, personal items and telephone in reach/Instruct patient to call for assistance before getting out of bed/chair/stretcher/Non-slip footwear applied when patient is off stretcher/[place] to call system/Physically safe environment - no spills, clutter or unnecessary equipment/Purposeful proactive rounding/Room/bathroom lighting operational, light cord in reach

## 2023-12-27 NOTE — PATIENT PROFILE ADULT - NSPROMEDSADMININFO_GEN_A_NUR
ICC VISIT NOTE       Chief Complaint   Patient presents with   • Worker's Compensation     noted 2 days ago after working on a toilet at night started to feel like something in eye and yesterday noted metal in eye & now painful and R-red eye        History Of Present Illness  Tunde is a 28 year old male presenting with foreign body sensation on right eye after grinding bolt on toilet 2 days ago.  Did not feel any material fly onto eye.  Right eye red and sees a small foreign material on cornea.  Denies changes in vision.  Has right eye discomfort and teary.      Past Medical History  Past Medical History:   Diagnosis Date   • No known problems         Surgical History  Past Surgical History:   Procedure Laterality Date   • Finger amputation      left index finger         Social History  Social History     Tobacco Use   • Smoking status: Current Every Day Smoker     Years: 1.00     Types: Cigarettes   • Smokeless tobacco: Current User   Vaping Use   • Vaping Use: never used   Substance Use Topics   • Alcohol use: Not Currently   • Drug use: Yes     Types: Marijuana       Family History    Family History   Problem Relation Age of Onset   • Diabetes Maternal Grandfather    • Diabetes Paternal Grandfather         FH reviewed and negative for any heart or lung disease, bleeding disorders, or issues related to this complaint.     Allergies  ALLERGIES:  Patient has no known allergies.    Medications  Current Outpatient Medications   Medication Sig   • ketorolac (ACULAR) 0.4 % Solution Place 1 drop into right eye 4 times daily for 1 day.     No current facility-administered medications for this visit.        Review of Systems  Constitutional: Negative for fever and chills.  Skin: Negative for rash.  HEENT: Negative for ear pain, rhinorrhea, sinus congestion, or sore throat.  Positive for foreign body sensation on right eye with redness and teary.  Respiratory: Negative wheezing, shortness of breath, or  cough.  Cardiovascular: Negative for chest pain, chest pressure, palpitations or diaphoresis.  Gastrointestinal: Negative for nausea, vomiting, diarrhea or abdominal pain.  Genitourinary: Negative for dysuria, urgency, frequency, hematuria or flank pain.  Extremities:  Negative for joint swelling or joint pain.  Neurologic:  Negative for change in sensory or motor function.  Negative for headache.  Endocrine: Negative for heat or cold intolerance, weight loss or gain.  Hematological: Negative for bleeding, bruising or adenopathy.  Psychiatric: Negative for change in affect, change in mentation or sleep disturbance.  All other systems reviewed and otherwise negative unless noted.      Last Recorded Vitals  Vitals:    06/16/21 1518   BP: 111/69   Pulse: 82   Resp: 16   Temp: 98 °F (36.7 °C)   TempSrc: Temporal   SpO2: 97%   Weight: 63.5 kg (140 lb)   Height: 5' 11\" (1.803 m)   PainSc:  0        Physical Exam  Vitals and nursing note reviewed.   Constitutional:       General: He is not in acute distress.     Appearance: Normal appearance.   HENT:      Head: Normocephalic and atraumatic.      Right Ear: External ear normal.      Left Ear: External ear normal.      Nose: Nose normal.   Eyes:      General: Lids are normal. Vision grossly intact. Gaze aligned appropriately.         Right eye: Foreign body present. No discharge.         Left eye: No foreign body or discharge.      Extraocular Movements: Extraocular movements intact.      Conjunctiva/sclera:      Right eye: Right conjunctiva is injected. No exudate or hemorrhage.     Left eye: Left conjunctiva is not injected. No exudate or hemorrhage.     Pupils: Pupils are equal, round, and reactive to light.        Comments: Small speck on right cornea present   Cardiovascular:      Rate and Rhythm: Normal rate and regular rhythm.      Heart sounds: Normal heart sounds.   Pulmonary:      Effort: Pulmonary effort is normal.      Breath sounds: Normal breath sounds.    Musculoskeletal:         General: Normal range of motion.      Cervical back: Normal range of motion and neck supple.   Skin:     General: Skin is warm and dry.   Neurological:      Mental Status: He is alert and oriented to person, place, and time.      Gait: Gait is intact.   Psychiatric:         Mood and Affect: Mood and affect normal.         Cognition and Memory: Memory normal.        Foreign Body Removal    Date/Time: 6/16/2021 3:42 PM  Performed by: Chana Zavaleta CNP  Authorized by: Chana Zavaleta CNP   Body area: eye  Location details: right cornea    Anesthesia:  Local anesthetic: Alcaine eye drop.    Sedation:  Patient sedated: no    Patient restrained: no  Patient cooperative: yes  Localization method: eyelid eversion and visualized  Removal mechanism: moist cotton swab  No residual rust ring present.  Depth: superficial  Complexity: simple  Post-procedure assessment: foreign body removed  Patient tolerance: patient tolerated the procedure well with no immediate complications        Imaging:    No image results found.        Labs  No results found for this visit on 06/16/21.     Differential Diagnois:  Diagnoses that have been ruled out:   None   Diagnoses that are still under consideration:   None   Final diagnoses:   1. Foreign body of right eye, initial encounter            (T15.91XA) Foreign body of right eye, initial encounter  (primary encounter diagnosis)  Plan: proparacaine (ALCAINE) 0.5 % ophthalmic         solution 1 drop, ketorolac (ACULAR) 0.4 %         Solution, DISCONTINUED: ketorolac (ACULAR) 0.5         % ophthalmic solution 1 drop      New Prescriptions    KETOROLAC (ACULAR) 0.4 % SOLUTION    Place 1 drop into right eye 4 times daily for 1 day.        Patient Instructions     Apply:   KETOROLAC (ACULAR) 0.4 % SOLUTION    Place 1 drop into right eye 4 times daily for 1 day.       Patient Education     Particle Removed from Eye (Corneal Foreign Body)    A particle got into your eye  and stuck to the cornea (the clear part in the front of the eye). Your healthcare provider has removed this particle. The cornea is very sensitive and may still hurt for another 1 to 2 days while it heals.  If a metal particle was in your eye, a \"rust ring\" may have formed. This may require a second visit for complete removal.  Your healthcare provider may have put an eye patch on your eye. This may help the healing process. But you also may not receive an eye patch. For several types of injuries to the cornea, they are not recommended.  Home care  · You may wear sunglasses to help reduce symptoms while the eye is healing, unless advised otherwise by your healthcare provider.  · You may use acetaminophen or ibuprofen to control pain, unless another pain medicine was prescribed. (Note: If you have chronic liver or kidney disease, or if you have ever had a stomach ulcer or gastrointestinal bleeding, talk with your healthcare provider before using these medicines.)  · If you received an eye patch:  ? It should not be left in place for more than 24 hours, unless advised otherwise by your healthcare provider.  ? Always keep your return appointment for patch removal and re-exam. Your eye could be harmed if the patch remains in place longer than advised.  ? Do not drive a motor vehicle or operate machinery with the patch in place. You will have trouble judging distances with only one eye.  ? If eye drops or ointment were prescribed, use as directed.  Follow-up care  · No eye patch: If no patch was used, but the pain continues for more than 48 hours, you should have another eye exam.  · Eye patch: If your eye was patched and you were given a return appointment for patch removal and re-exam, do not miss the visit. Again, it could be harmful to your eye if the patch remains in place longer than advised. However, if you were asked to remove the eye patch within 24 hours (or as directed by your healthcare provider), contact  your healthcare provider right away if your pain increases or get worse after removal of the eye patch.  When to seek medical advice  Call your healthcare provider right away if any of these occur:  · Increasing eye pain or pain that does not improve after 24 hours  · Discharge from the eye  · Redness of the eye or swelling of the eyelids  · Worsening vision  Date Last Reviewed: 2/24/2017 © 2000-2018 [employer]. [address] [place] [address]. All rights reserved. This information is not intended as a substitute for professional medical care. Always follow your healthcare professional's instructions.               Primary Care Physician  No Pcp    Follow-up Information     Follow up With Specialties Details Why Contact Info    Pepper Hernández MD Ophthalmology Go in 1 day If symptoms worsen [address]   [address] [address] [phone]            Chana Zavaleta, CNP       no concerns

## 2023-12-27 NOTE — ED PROVIDER NOTE - CLINICAL SUMMARY MEDICAL DECISION MAKING FREE TEXT BOX
impaired balance/impaired postural control/decreased strength/decreased endurance
Right thumb infection, with proximal spread.  Will check labs, IV antibiotics, admission

## 2023-12-27 NOTE — ED PROVIDER NOTE - OBJECTIVE STATEMENT
28-year-old male with a history of bipolar disorder presents with right thumb infection over past 4 to 5 days.  Patient has been on p.o. doxycycline over past 4 days.  Now with some increased discomfort to the thumb.  No fever or chills noted.  No numbness or tingling.  No focal weakness.  No aggravating or alleviating factors otherwise noted.  No other acute injury or complaints.

## 2023-12-27 NOTE — PATIENT PROFILE ADULT - FALL HARM RISK - UNIVERSAL INTERVENTIONS
Bed in lowest position, wheels locked, appropriate side rails in place/Call bell, personal items and telephone in reach/Instruct patient to call for assistance before getting out of bed or chair/Non-slip footwear when patient is out of bed/[place] to call system/Physically safe environment - no spills, clutter or unnecessary equipment/Purposeful Proactive Rounding/Room/bathroom lighting operational, light cord in reach Bed in lowest position, wheels locked, appropriate side rails in place/Call bell, personal items and telephone in reach/Instruct patient to call for assistance before getting out of bed or chair/Non-slip footwear when patient is out of bed/[place] to call system/Physically safe environment - no spills, clutter or unnecessary equipment/Purposeful Proactive Rounding/Room/bathroom lighting operational, light cord in reach
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
## 2023-12-28 LAB
ANION GAP SERPL CALC-SCNC: 7 MMOL/L — SIGNIFICANT CHANGE UP (ref 5–17)
ANION GAP SERPL CALC-SCNC: 7 MMOL/L — SIGNIFICANT CHANGE UP (ref 5–17)
BASOPHILS # BLD AUTO: 0.02 K/UL — SIGNIFICANT CHANGE UP (ref 0–0.2)
BASOPHILS # BLD AUTO: 0.02 K/UL — SIGNIFICANT CHANGE UP (ref 0–0.2)
BASOPHILS NFR BLD AUTO: 0.3 % — SIGNIFICANT CHANGE UP (ref 0–2)
BASOPHILS NFR BLD AUTO: 0.3 % — SIGNIFICANT CHANGE UP (ref 0–2)
BUN SERPL-MCNC: 15 MG/DL — SIGNIFICANT CHANGE UP (ref 7–23)
BUN SERPL-MCNC: 15 MG/DL — SIGNIFICANT CHANGE UP (ref 7–23)
CALCIUM SERPL-MCNC: 9.1 MG/DL — SIGNIFICANT CHANGE UP (ref 8.4–10.5)
CALCIUM SERPL-MCNC: 9.1 MG/DL — SIGNIFICANT CHANGE UP (ref 8.4–10.5)
CHLORIDE SERPL-SCNC: 107 MMOL/L — SIGNIFICANT CHANGE UP (ref 96–108)
CHLORIDE SERPL-SCNC: 107 MMOL/L — SIGNIFICANT CHANGE UP (ref 96–108)
CO2 SERPL-SCNC: 25 MMOL/L — SIGNIFICANT CHANGE UP (ref 22–31)
CO2 SERPL-SCNC: 25 MMOL/L — SIGNIFICANT CHANGE UP (ref 22–31)
CREAT SERPL-MCNC: 0.96 MG/DL — SIGNIFICANT CHANGE UP (ref 0.5–1.3)
CREAT SERPL-MCNC: 0.96 MG/DL — SIGNIFICANT CHANGE UP (ref 0.5–1.3)
CRP SERPL-MCNC: 3 MG/L — SIGNIFICANT CHANGE UP
CRP SERPL-MCNC: 3 MG/L — SIGNIFICANT CHANGE UP
EGFR: 112 ML/MIN/1.73M2 — SIGNIFICANT CHANGE UP
EGFR: 112 ML/MIN/1.73M2 — SIGNIFICANT CHANGE UP
EOSINOPHIL # BLD AUTO: 0.42 K/UL — SIGNIFICANT CHANGE UP (ref 0–0.5)
EOSINOPHIL # BLD AUTO: 0.42 K/UL — SIGNIFICANT CHANGE UP (ref 0–0.5)
EOSINOPHIL NFR BLD AUTO: 6.1 % — HIGH (ref 0–6)
EOSINOPHIL NFR BLD AUTO: 6.1 % — HIGH (ref 0–6)
ERYTHROCYTE [SEDIMENTATION RATE] IN BLOOD: 2 MM/HR — SIGNIFICANT CHANGE UP (ref 0–9)
ERYTHROCYTE [SEDIMENTATION RATE] IN BLOOD: 2 MM/HR — SIGNIFICANT CHANGE UP (ref 0–9)
GLUCOSE SERPL-MCNC: 99 MG/DL — SIGNIFICANT CHANGE UP (ref 70–99)
GLUCOSE SERPL-MCNC: 99 MG/DL — SIGNIFICANT CHANGE UP (ref 70–99)
HCT VFR BLD CALC: 41.6 % — SIGNIFICANT CHANGE UP (ref 39–50)
HCT VFR BLD CALC: 41.6 % — SIGNIFICANT CHANGE UP (ref 39–50)
HGB BLD-MCNC: 14.2 G/DL — SIGNIFICANT CHANGE UP (ref 13–17)
HGB BLD-MCNC: 14.2 G/DL — SIGNIFICANT CHANGE UP (ref 13–17)
IMM GRANULOCYTES NFR BLD AUTO: 1 % — HIGH (ref 0–0.9)
IMM GRANULOCYTES NFR BLD AUTO: 1 % — HIGH (ref 0–0.9)
LYMPHOCYTES # BLD AUTO: 2.38 K/UL — SIGNIFICANT CHANGE UP (ref 1–3.3)
LYMPHOCYTES # BLD AUTO: 2.38 K/UL — SIGNIFICANT CHANGE UP (ref 1–3.3)
LYMPHOCYTES # BLD AUTO: 34.7 % — SIGNIFICANT CHANGE UP (ref 13–44)
LYMPHOCYTES # BLD AUTO: 34.7 % — SIGNIFICANT CHANGE UP (ref 13–44)
MAGNESIUM SERPL-MCNC: 2 MG/DL — SIGNIFICANT CHANGE UP (ref 1.6–2.6)
MAGNESIUM SERPL-MCNC: 2 MG/DL — SIGNIFICANT CHANGE UP (ref 1.6–2.6)
MCHC RBC-ENTMCNC: 30.9 PG — SIGNIFICANT CHANGE UP (ref 27–34)
MCHC RBC-ENTMCNC: 30.9 PG — SIGNIFICANT CHANGE UP (ref 27–34)
MCHC RBC-ENTMCNC: 34.1 GM/DL — SIGNIFICANT CHANGE UP (ref 32–36)
MCHC RBC-ENTMCNC: 34.1 GM/DL — SIGNIFICANT CHANGE UP (ref 32–36)
MCV RBC AUTO: 90.6 FL — SIGNIFICANT CHANGE UP (ref 80–100)
MCV RBC AUTO: 90.6 FL — SIGNIFICANT CHANGE UP (ref 80–100)
MONOCYTES # BLD AUTO: 0.63 K/UL — SIGNIFICANT CHANGE UP (ref 0–0.9)
MONOCYTES # BLD AUTO: 0.63 K/UL — SIGNIFICANT CHANGE UP (ref 0–0.9)
MONOCYTES NFR BLD AUTO: 9.2 % — SIGNIFICANT CHANGE UP (ref 2–14)
MONOCYTES NFR BLD AUTO: 9.2 % — SIGNIFICANT CHANGE UP (ref 2–14)
MRSA PCR RESULT.: SIGNIFICANT CHANGE UP
MRSA PCR RESULT.: SIGNIFICANT CHANGE UP
NEUTROPHILS # BLD AUTO: 3.33 K/UL — SIGNIFICANT CHANGE UP (ref 1.8–7.4)
NEUTROPHILS # BLD AUTO: 3.33 K/UL — SIGNIFICANT CHANGE UP (ref 1.8–7.4)
NEUTROPHILS NFR BLD AUTO: 48.7 % — SIGNIFICANT CHANGE UP (ref 43–77)
NEUTROPHILS NFR BLD AUTO: 48.7 % — SIGNIFICANT CHANGE UP (ref 43–77)
NRBC # BLD: 0 /100 WBCS — SIGNIFICANT CHANGE UP (ref 0–0)
NRBC # BLD: 0 /100 WBCS — SIGNIFICANT CHANGE UP (ref 0–0)
PLATELET # BLD AUTO: 212 K/UL — SIGNIFICANT CHANGE UP (ref 150–400)
PLATELET # BLD AUTO: 212 K/UL — SIGNIFICANT CHANGE UP (ref 150–400)
POTASSIUM SERPL-MCNC: 3.9 MMOL/L — SIGNIFICANT CHANGE UP (ref 3.5–5.3)
POTASSIUM SERPL-MCNC: 3.9 MMOL/L — SIGNIFICANT CHANGE UP (ref 3.5–5.3)
POTASSIUM SERPL-SCNC: 3.9 MMOL/L — SIGNIFICANT CHANGE UP (ref 3.5–5.3)
POTASSIUM SERPL-SCNC: 3.9 MMOL/L — SIGNIFICANT CHANGE UP (ref 3.5–5.3)
PROCALCITONIN SERPL-MCNC: 0.04 NG/ML — SIGNIFICANT CHANGE UP (ref 0.02–0.1)
PROCALCITONIN SERPL-MCNC: 0.04 NG/ML — SIGNIFICANT CHANGE UP (ref 0.02–0.1)
RBC # BLD: 4.59 M/UL — SIGNIFICANT CHANGE UP (ref 4.2–5.8)
RBC # BLD: 4.59 M/UL — SIGNIFICANT CHANGE UP (ref 4.2–5.8)
RBC # FLD: 11.6 % — SIGNIFICANT CHANGE UP (ref 10.3–14.5)
RBC # FLD: 11.6 % — SIGNIFICANT CHANGE UP (ref 10.3–14.5)
S AUREUS DNA NOSE QL NAA+PROBE: SIGNIFICANT CHANGE UP
S AUREUS DNA NOSE QL NAA+PROBE: SIGNIFICANT CHANGE UP
SODIUM SERPL-SCNC: 139 MMOL/L — SIGNIFICANT CHANGE UP (ref 135–145)
SODIUM SERPL-SCNC: 139 MMOL/L — SIGNIFICANT CHANGE UP (ref 135–145)
WBC # BLD: 6.85 K/UL — SIGNIFICANT CHANGE UP (ref 3.8–10.5)
WBC # BLD: 6.85 K/UL — SIGNIFICANT CHANGE UP (ref 3.8–10.5)
WBC # FLD AUTO: 6.85 K/UL — SIGNIFICANT CHANGE UP (ref 3.8–10.5)
WBC # FLD AUTO: 6.85 K/UL — SIGNIFICANT CHANGE UP (ref 3.8–10.5)

## 2023-12-28 RX ORDER — CEFEPIME 1 G/1
2000 INJECTION, POWDER, FOR SOLUTION INTRAMUSCULAR; INTRAVENOUS EVERY 12 HOURS
Refills: 0 | Status: DISCONTINUED | OUTPATIENT
Start: 2023-12-28 | End: 2023-12-28

## 2023-12-28 RX ADMIN — DIVALPROEX SODIUM 1250 MILLIGRAM(S): 500 TABLET, DELAYED RELEASE ORAL at 21:02

## 2023-12-28 RX ADMIN — Medication 37.5 MILLIGRAM(S): at 08:43

## 2023-12-28 RX ADMIN — CEFEPIME 100 MILLIGRAM(S): 1 INJECTION, POWDER, FOR SOLUTION INTRAMUSCULAR; INTRAVENOUS at 17:59

## 2023-12-28 RX ADMIN — CEFEPIME 100 MILLIGRAM(S): 1 INJECTION, POWDER, FOR SOLUTION INTRAMUSCULAR; INTRAVENOUS at 07:06

## 2023-12-28 RX ADMIN — LITHIUM CARBONATE 750 MILLIGRAM(S): 300 TABLET, EXTENDED RELEASE ORAL at 21:02

## 2023-12-28 NOTE — CARE COORDINATION ASSESSMENT. - NSCAREPROVIDERS_GEN_ALL_CORE_FT
CARE PROVIDERS:  Accepting Physician: Dmitri Larios  Admitting: Dmitri Larios  Attending: Dmitri Larios  Case Management: Martita Brown  Consultant: Bren Garcia  Consultant: Siria Washington  Consultant: Marilee Webster  Consultant: Miracle Coon  Covering Team: Chase Butterfield  Covering Team: Anderson Ojeda  ED Attending: Jerardo Figueroa  ED Nurse: Mariel Oconnell  Infection Control: Grace Khan  Nurse: Fang Kearney  Nurse: Tessa Fontenot  Nurse: Mayra Swanson  Nurse: Jaylene Saba  Outpatient Provider: Mehrdad Duenas  Override: Kady Domínguez  PCA/Nursing Assistant: Federico Hernandez  PCA/Nursing Assistant: Sarah Johnson  Primary Team: Luly Muñoz  Registered Dietitian: Severiano Segovia// Supp. Assoc.: Soheila Bronson

## 2023-12-28 NOTE — CONSULT NOTE ADULT - SUBJECTIVE AND OBJECTIVE BOX
[facility], Division of Infectious Diseases  BRADLEY Pavon S. Shah, Y. Patel, G. [facility]  [phone]    NIXON ESCAMILLA  28y, Male  [id number]    HPI--  HPI:  This is a 28-year-old male with a history of bipolar disorder presents with right thumb infection over past 4 to 5 days.  Patient has been on p.o. doxycycline over past 4 days. Today patient noticed redness in the hand and spreading up the forearm. He also has increased discomfort to the thumb. Patient reportedly sometimes picks at finger nails. He also went bowling last weekend which may have aggravated the symptoms. No fever or chills noted.  No numbness or tingling.  No focal weakness.  No alleviating factors.  No other acute injury or complaints. (27 Dec 2023 19:25)    Afebrile  Failed outpatient doxycycline  Feeling better and improved UE swelling since elevating arm    Active Medications--  acetaminophen     Tablet .. 650 milliGRAM(s) Oral every 6 hours PRN  aluminum hydroxide/magnesium hydroxide/simethicone Suspension 30 milliLiter(s) Oral every 4 hours PRN  cefepime   IVPB 2000 milliGRAM(s) IV Intermittent every 12 hours  cefepime   IVPB      divalproex ER 1250 milliGRAM(s) Oral <User Schedule>  lithium 750 milliGRAM(s) Oral <User Schedule>  melatonin 3 milliGRAM(s) Oral at bedtime PRN  ondansetron Injectable 4 milliGRAM(s) IV Push every 6 hours PRN  venlafaxine XR 37.5 milliGRAM(s) Oral daily    Antimicrobials:   cefepime   IVPB 2000 milliGRAM(s) IV Intermittent every 12 hours  cefepime   IVPB        Immunologic:     ROS:  CONSTITUTIONAL: No fevers or chills. No weakness or headache. No weight changes.  EYES/ENT: No visual or hearing changes. No sore throat or throat pain .  NECK: No pain or stiffness  RESPIRATORY: No cough, wheezing, or hemoptysis. No shortness of breath  CARDIOVASCULAR: No chest pain or palpitations  GASTROINTESTINAL: No abdominal pain. No nausea or vomiting. No diarrhea or constipation.  GENITOURINARY: No dysuria, frequency or hematuria  NEUROLOGICAL: No numbness or weakness  SKIN: No itching or rashes  PSYCHIATRIC: Pleasant. Appropriate affect    Allergies: Nuts (Anaphylaxis)  No Known Drug Allergies    PMH -- No pertinent past medical history    Anxiety and depression    Bipolar disorder      PSH --   FH --   Social History --  EtOH: denies   Tobacco: denies   Drug Use: denies     Travel/Environmental/Occupational History:    Physical Exam--  Vital Signs Last 24 Hrs  T(F): 97.5 (28 Dec 2023 05:27), Max: 98.2 (27 Dec 2023 18:08)  HR: 76 (28 Dec 2023 05:27) (76 - 89)  BP: 122/67 (28 Dec 2023 05:27) (114/74 - 127/77)  RR: 18 (28 Dec 2023 05:27) (15 - 18)  SpO2: 95% (28 Dec 2023 05:27) (94% - 99%)  General: nontoxic-appearing, no acute distress  HEENT: NC/AT, EOMI,  Lungs: Symmetric chest rise  Heart: RRR  Abdomen: Soft. Nondistended. Nontender.  Extremities: R thumb cellulitis, w/ streaking down arm, improved from yesterday  Skin: Warm. Dry. Good turgor.     Laboratory & Imaging Data:  CBC:                       14.2   6.85  )-----------( 212      ( 28 Dec 2023 06:00 )             41.6     CMP: 12-28    139  |  107  |  15  ----------------------------<  99  3.9   |  25  |  0.96    Ca    9.1      28 Dec 2023 06:00  Mg     2.0     12-28    TPro  6.6  /  Alb  3.9  /  TBili  0.2  /  DBili  x   /  AST  30  /  ALT  34  /  AlkPhos  43  12-27    LIVER FUNCTIONS - ( 27 Dec 2023 18:29 )  Alb: 3.9 g/dL / Pro: 6.6 g/dL / ALK PHOS: 43 U/L / ALT: 34 U/L / AST: 30 U/L / GGT: x           Urinalysis Basic - ( 28 Dec 2023 06:00 )    Color: x / Appearance: x / SG: x / pH: x  Gluc: 99 mg/dL / Ketone: x  / Bili: x / Urobili: x   Blood: x / Protein: x / Nitrite: x   Leuk Esterase: x / RBC: x / WBC x   Sq Epi: x / Non Sq Epi: x / Bacteria: x        Microbiology: reviewed        Radiology: reviewed     [facility], Division of Infectious Diseases  BRADLEY Pavon S. Shah, Y. Patel, G. [facility]  [phone]    NIXON ESCAMILLA  28y, Male  [id number]    HPI--  HPI:  This is a 28-year-old male with a history of bipolar disorder presents with right thumb infection over past 4 to 5 days.  Patient has been on p.o. doxycycline over past 4 days. Today patient noticed redness in the hand and spreading up the forearm. He also has increased discomfort to the thumb. Patient reportedly sometimes picks at finger nails. He also went bowling last weekend which may have aggravated the symptoms. No fever or chills noted.  No numbness or tingling.  No focal weakness.  No alleviating factors.  No other acute injury or complaints. (27 Dec 2023 19:25)    Afebrile  Failed outpatient doxycycline  Feeling better and improved UE swelling since elevating arm    Active Medications--  acetaminophen     Tablet .. 650 milliGRAM(s) Oral every 6 hours PRN  aluminum hydroxide/magnesium hydroxide/simethicone Suspension 30 milliLiter(s) Oral every 4 hours PRN  cefepime   IVPB 2000 milliGRAM(s) IV Intermittent every 12 hours  cefepime   IVPB      divalproex ER 1250 milliGRAM(s) Oral <User Schedule>  lithium 750 milliGRAM(s) Oral <User Schedule>  melatonin 3 milliGRAM(s) Oral at bedtime PRN  ondansetron Injectable 4 milliGRAM(s) IV Push every 6 hours PRN  venlafaxine XR 37.5 milliGRAM(s) Oral daily    Antimicrobials:   cefepime   IVPB 2000 milliGRAM(s) IV Intermittent every 12 hours  cefepime   IVPB        Immunologic:     ROS:  CONSTITUTIONAL: No fevers or chills. No weakness or headache. No weight changes.  EYES/ENT: No visual or hearing changes. No sore throat or throat pain .  NECK: No pain or stiffness  RESPIRATORY: No cough, wheezing, or hemoptysis. No shortness of breath  CARDIOVASCULAR: No chest pain or palpitations  GASTROINTESTINAL: No abdominal pain. No nausea or vomiting. No diarrhea or constipation.  GENITOURINARY: No dysuria, frequency or hematuria  NEUROLOGICAL: No numbness or weakness  SKIN: No itching or rashes  PSYCHIATRIC: Pleasant. Appropriate affect    Allergies: Nuts (Anaphylaxis)  No Known Drug Allergies    PMH -- No pertinent past medical history    Anxiety and depression    Bipolar disorder      PSH --   FH --   Social History --  EtOH: denies   Tobacco: denies   Drug Use: denies     Travel/Environmental/Occupational History:    Physical Exam--  Vital Signs Last 24 Hrs  T(F): 97.5 (28 Dec 2023 05:27), Max: 98.2 (27 Dec 2023 18:08)  HR: 76 (28 Dec 2023 05:27) (76 - 89)  BP: 122/67 (28 Dec 2023 05:27) (114/74 - 127/77)  RR: 18 (28 Dec 2023 05:27) (15 - 18)  SpO2: 95% (28 Dec 2023 05:27) (94% - 99%)  General: nontoxic-appearing, no acute distress  HEENT: NC/AT, EOMI,  Lungs: Symmetric chest rise  Heart: RRR  Abdomen: Soft. Nondistended. Nontender.  Extremities: R thumb cellulitis, w/ streaking down arm, improved from yesterday  Skin: Warm. Dry. Good turgor.     Laboratory & Imaging Data:  CBC:                       14.2   6.85  )-----------( 212      ( 28 Dec 2023 06:00 )             41.6     CMP: 12-28    139  |  107  |  15  ----------------------------<  99  3.9   |  25  |  0.96    Ca    9.1      28 Dec 2023 06:00  Mg     2.0     12-28    TPro  6.6  /  Alb  3.9  /  TBili  0.2  /  DBili  x   /  AST  30  /  ALT  34  /  AlkPhos  43  12-27    LIVER FUNCTIONS - ( 27 Dec 2023 18:29 )  Alb: 3.9 g/dL / Pro: 6.6 g/dL / ALK PHOS: 43 U/L / ALT: 34 U/L / AST: 30 U/L / GGT: x           Urinalysis Basic - ( 28 Dec 2023 06:00 )    Color: x / Appearance: x / SG: x / pH: x  Gluc: 99 mg/dL / Ketone: x  / Bili: x / Urobili: x   Blood: x / Protein: x / Nitrite: x   Leuk Esterase: x / RBC: x / WBC x   Sq Epi: x / Non Sq Epi: x / Bacteria: x        Microbiology: reviewed        Radiology: reviewed

## 2023-12-28 NOTE — PROGRESS NOTE ADULT - SUBJECTIVE AND OBJECTIVE BOX
Date of Service 12-28-23 @ 16:16    Patient is a 28y old  Male who presents with a chief complaint of R arm redness (28 Dec 2023 14:07)      INTERVAL /OVERNIGHT EVENTS: redness improved, pain improved    MEDICATIONS  (STANDING):  cefepime   IVPB 2000 milliGRAM(s) IV Intermittent every 12 hours  cefepime   IVPB      divalproex ER 1250 milliGRAM(s) Oral <User Schedule>  lithium 750 milliGRAM(s) Oral <User Schedule>  venlafaxine XR 37.5 milliGRAM(s) Oral daily    MEDICATIONS  (PRN):  acetaminophen     Tablet .. 650 milliGRAM(s) Oral every 6 hours PRN Temp greater or equal to 38C (100.4F), Mild Pain (1 - 3)  aluminum hydroxide/magnesium hydroxide/simethicone Suspension 30 milliLiter(s) Oral every 4 hours PRN Dyspepsia  melatonin 3 milliGRAM(s) Oral at bedtime PRN Insomnia  ondansetron Injectable 4 milliGRAM(s) IV Push every 6 hours PRN Nausea and/or Vomiting      Allergies    Nuts (Anaphylaxis)  No Known Drug Allergies    Intolerances        REVIEW OF SYSTEMS:  CONSTITUTIONAL: No fever, weight loss, or fatigue  EYES: No eye pain, visual disturbances, or discharge  ENMT:  No difficulty hearing, tinnitus, vertigo; No sinus or throat pain  NECK: No pain or stiffness  RESPIRATORY: No cough, wheezing, chills or hemoptysis; No shortness of breath  CARDIOVASCULAR: No chest pain, palpitations, dizziness, or leg swelling  GASTROINTESTINAL: No abdominal or epigastric pain. No nausea, vomiting, or hematemesis; No diarrhea or constipation. No melena or hematochezia.  GENITOURINARY: No dysuria, frequency, hematuria, or incontinence  NEUROLOGICAL: No headaches, memory loss, loss of strength, numbness, or tremors  SKIN: No itching, burning, rashes, or lesions   LYMPH NODES: No enlarged glands  ENDOCRINE: No heat or cold intolerance; No hair loss; No polydipsia or polyuria  MUSCULOSKELETAL: No joint pain or swelling; No muscle, back, or extremity pain  PSYCHIATRIC: No depression, anxiety, mood swings, or difficulty sleeping  HEME/LYMPH: No easy bruising, or bleeding gums  ALLERGY AND IMMUNOLOGIC: No hives or eczema    Vital Signs Last 24 Hrs  T(C): 36.6 (28 Dec 2023 13:10), Max: 36.8 (27 Dec 2023 18:08)  T(F): 97.8 (28 Dec 2023 13:10), Max: 98.2 (27 Dec 2023 18:08)  HR: 75 (28 Dec 2023 13:10) (75 - 89)  BP: 103/66 (28 Dec 2023 13:10) (103/66 - 127/77)  BP(mean): --  RR: 16 (28 Dec 2023 13:10) (15 - 18)  SpO2: 95% (28 Dec 2023 13:10) (94% - 99%)    Parameters below as of 28 Dec 2023 13:10  Patient On (Oxygen Delivery Method): room air        PHYSICAL EXAM:  GENERAL: NAD, well-groomed, well-developed  HEAD:  Atraumatic, Normocephalic  EYES: EOMI, PERRLA, conjunctiva and sclera clear  ENMT: No tonsillar erythema, exudates, or enlargement; Moist mucous membranes, Good dentition, No lesions  NECK: Supple, No JVD, Normal thyroid  NERVOUS SYSTEM:  Alert & Oriented X3, Good concentration; Motor Strength 5/5 B/L upper and lower extremities; DTRs 2+ intact and symmetric  CHEST/LUNG: Clear to auscultation bilaterally; No rales, rhonchi, wheezing, or rubs  HEART: Regular rate and rhythm; No murmurs, rubs, or gallops  ABDOMEN: Soft, Nontender, Nondistended; Bowel sounds present  EXTREMITIES:  2+ Peripheral Pulses, No clubbing, cyanosis, or edema  LYMPH: No lymphadenopathy noted  SKIN: No rashes or lesions    LABS:                        14.2   6.85  )-----------( 212      ( 28 Dec 2023 06:00 )             41.6     28 Dec 2023 06:00    139    |  107    |  15     ----------------------------<  99     3.9     |  25     |  0.96     Ca    9.1        28 Dec 2023 06:00  Mg     2.0       28 Dec 2023 06:00    TPro  6.6    /  Alb  3.9    /  TBili  0.2    /  DBili  x      /  AST  30     /  ALT  34     /  AlkPhos  43     27 Dec 2023 18:29    PT/INR - ( 27 Dec 2023 18:29 )   PT: 11.6 sec;   INR: 1.07 ratio         PTT - ( 27 Dec 2023 18:29 )  PTT:30.2 sec  Urinalysis Basic - ( 28 Dec 2023 06:00 )    Color: x / Appearance: x / SG: x / pH: x  Gluc: 99 mg/dL / Ketone: x  / Bili: x / Urobili: x   Blood: x / Protein: x / Nitrite: x   Leuk Esterase: x / RBC: x / WBC x   Sq Epi: x / Non Sq Epi: x / Bacteria: x      CAPILLARY BLOOD GLUCOSE          RADIOLOGY & ADDITIONAL TESTS:    Notes Reviewed:  [x ] YES  [ ] NO    Care Discussed with Consultants/Other Providers [x ] YES  [ ] NO

## 2023-12-28 NOTE — CARE COORDINATION ASSESSMENT. - NSDCPLANSERVICES_GEN_ALL_CORE
Per treatment team rounds: 28-y presents with right thumb infection over past 4 to 5 days.  Patient has been on p.o. doxycycline over past 4 days. Today patient noticed redness in the hand and spreading up the forearm. seen by surgeon Siria Washington)Continue IV abx Cefepime /ID/local wound care  clean right thumb with warm NS soaks every 6 hours.  Elevate right extremity. Will consider further I&D if not definitively improved by another 1-2 days. ---------------    CM met with pt. at bedside introduced self and role of CM and provided discharge planning resource folder and CM contact info.  Pt. states he lives in an apartment with his Mom Norma and Brother.  States he noticed streaks of red up his right arm and was sent to the ED for IVAB.  Reports he is independent in all ADL/ ambulation PTA.  States mom can be caregiver if needed.  CM met with mom Norma who is very supportive and will transport pt. home and help as needed  Explained will need to follow up post acute within a week with PCP or surgeon as directed at time of transition to home.     Outpatient Providers :  PCP is Dr. Mehrdad Duenas [phone] last seen March 2023  Pharmacy is [facility].     -----------------/Anticipated Needs Unclear at Present Per treatment team rounds: 28-y presents with right thumb infection over past 4 to 5 days.  Patient has been on p.o. doxycycline over past 4 days. Today patient noticed redness in the hand and spreading up the forearm. seen by surgeon Siria Washington)Continue IV abx Cefepime /ID/local wound care  clean right thumb with warm NS soaks every 6 hours.  Elevate right extremity. Will consider further I&D if not definitively improved by another 1-2 days. ---------------    CM met with pt. at bedside introduced self and role of CM and provided discharge planning resource folder and CM contact info.  Pt. states he lives in an apartment with his Mom Norma and Brother.  States he noticed streaks of red up his right arm and was sent to the ED for IVAB.  Reports he is independent in all ADL/ ambulation PTA.  States mom can be caregiver if needed.  CM met with mom Norma who is very supportive and will transport pt. home and help as needed  Explained will need to follow up post acute within a week with PCP or surgeon as directed at time of transition to home.     Outpatient Providers :  PCP is Dr. Mehrdad Duenas [phone] last seen March 2023  Pharmacy is [facility].     -----------------/Anticipated Needs Unclear at Present

## 2023-12-28 NOTE — CONSULT NOTE ADULT - SUBJECTIVE AND OBJECTIVE BOX
Discussed w Carolina Paniagua and pt.  Rec: Continue IV abx/ID/local wound care (ordered by me).  Will consider further I&D if not definitively improved by another 1-2 days.

## 2023-12-28 NOTE — PROGRESS NOTE ADULT - PROBLEM SELECTOR PLAN 1
Admit  Patient with right thumb cellulitis which failed outpatient po antibiotic therapy. Now erythema spreading up the right hand and forearm  Patient had paronychia of the medial aspect of the right thumb which was I&D'd by ER --> small amount of pus drained. Cultures sent.  Start iv Cefepime 2g q12h  Check ESR, CRP, procalcitonin  Trend WBC  Blood cultures x 2  F/U culture data  Elevate RUE  ID consult Dr. Coon, et. al.  Hand consult Dr. Herrera  Further work-up/management pending clinical course.

## 2023-12-28 NOTE — CONSULT NOTE ADULT - ASSESSMENT
Pt is a 28M w/ PMHx of bipolar disorder presents with right thumb infection over past 4 to 5 days.  Patient has been on p.o. doxycycline over past 4 days. Today patient noticed redness in the hand and spreading up the forearm. He also has increased discomfort to the thumb.     R hand Cellulitis  failed outpatient po antibiotic therapy. Now erythema spreading up the right hand and forearm  Patient had paronychia of the medial aspect of the right thumb which was I&D'd by ER --> small amount of pus drained. Cultures sent.  Recommendations:   C/w cefepime  F/u MRSA swab (ordered)  F/u pending cx  Appreciate hand recs  Further recs to follow    D/w pt and mom ANGELINA Talavera at bedside  D/w Dr. Butterfield  Infectious Diseases will continue to follow. Please call with any questions.   Miracle Coon M.D.  [facility] of [facility]ious [facility] [phone]  For after 5 P.M. and weekends, please call [phone]     Pt is a 28M w/ PMHx of bipolar disorder presents with right thumb infection over past 4 to 5 days.  Patient has been on p.o. doxycycline over past 4 days. Today patient noticed redness in the hand and spreading up the forearm. He also has increased discomfort to the thumb.     R hand Cellulitis  failed outpatient po antibiotic therapy. Now erythema spreading up the right hand and forearm  Patient had paronychia of the medial aspect of the right thumb which was I&D'd by ER --> small amount of pus drained. Cultures sent.  Recommendations:   C/w cefepime  F/u MRSA swab (ordered)  F/u pending cx  Appreciate hand recs  Further recs to follow    D/w pt and mom ANGELINA Talavera at bedside  D/w Dr. Butterfield  Infectious Diseases will continue to follow. Please call with any questions.   Miracle Coon M.D.  [facility] Infectious [facility] [phone]  For after 5 P.M. and weekends, please call [phone]

## 2023-12-29 ENCOUNTER — TRANSCRIPTION ENCOUNTER (OUTPATIENT)
Age: 28
End: 2023-12-29

## 2023-12-29 VITALS
DIASTOLIC BLOOD PRESSURE: 76 MMHG | SYSTOLIC BLOOD PRESSURE: 120 MMHG | OXYGEN SATURATION: 95 % | HEART RATE: 69 BPM | RESPIRATION RATE: 16 BRPM | TEMPERATURE: 98 F

## 2023-12-29 LAB
ALBUMIN SERPL ELPH-MCNC: 3.6 G/DL — SIGNIFICANT CHANGE UP (ref 3.3–5)
ALBUMIN SERPL ELPH-MCNC: 3.6 G/DL — SIGNIFICANT CHANGE UP (ref 3.3–5)
ALP SERPL-CCNC: 40 U/L — SIGNIFICANT CHANGE UP (ref 30–120)
ALP SERPL-CCNC: 40 U/L — SIGNIFICANT CHANGE UP (ref 30–120)
ALT FLD-CCNC: 30 U/L — SIGNIFICANT CHANGE UP (ref 10–60)
ALT FLD-CCNC: 30 U/L — SIGNIFICANT CHANGE UP (ref 10–60)
ANION GAP SERPL CALC-SCNC: 6 MMOL/L — SIGNIFICANT CHANGE UP (ref 5–17)
ANION GAP SERPL CALC-SCNC: 6 MMOL/L — SIGNIFICANT CHANGE UP (ref 5–17)
AST SERPL-CCNC: 32 U/L — SIGNIFICANT CHANGE UP (ref 10–40)
AST SERPL-CCNC: 32 U/L — SIGNIFICANT CHANGE UP (ref 10–40)
BILIRUB SERPL-MCNC: 0.3 MG/DL — SIGNIFICANT CHANGE UP (ref 0.2–1.2)
BILIRUB SERPL-MCNC: 0.3 MG/DL — SIGNIFICANT CHANGE UP (ref 0.2–1.2)
BUN SERPL-MCNC: 19 MG/DL — SIGNIFICANT CHANGE UP (ref 7–23)
BUN SERPL-MCNC: 19 MG/DL — SIGNIFICANT CHANGE UP (ref 7–23)
CALCIUM SERPL-MCNC: 9.3 MG/DL — SIGNIFICANT CHANGE UP (ref 8.4–10.5)
CALCIUM SERPL-MCNC: 9.3 MG/DL — SIGNIFICANT CHANGE UP (ref 8.4–10.5)
CHLORIDE SERPL-SCNC: 102 MMOL/L — SIGNIFICANT CHANGE UP (ref 96–108)
CHLORIDE SERPL-SCNC: 102 MMOL/L — SIGNIFICANT CHANGE UP (ref 96–108)
CO2 SERPL-SCNC: 28 MMOL/L — SIGNIFICANT CHANGE UP (ref 22–31)
CO2 SERPL-SCNC: 28 MMOL/L — SIGNIFICANT CHANGE UP (ref 22–31)
CREAT SERPL-MCNC: 1.01 MG/DL — SIGNIFICANT CHANGE UP (ref 0.5–1.3)
CREAT SERPL-MCNC: 1.01 MG/DL — SIGNIFICANT CHANGE UP (ref 0.5–1.3)
EGFR: 105 ML/MIN/1.73M2 — SIGNIFICANT CHANGE UP
EGFR: 105 ML/MIN/1.73M2 — SIGNIFICANT CHANGE UP
GLUCOSE SERPL-MCNC: 95 MG/DL — SIGNIFICANT CHANGE UP (ref 70–99)
GLUCOSE SERPL-MCNC: 95 MG/DL — SIGNIFICANT CHANGE UP (ref 70–99)
HCT VFR BLD CALC: 44.1 % — SIGNIFICANT CHANGE UP (ref 39–50)
HCT VFR BLD CALC: 44.1 % — SIGNIFICANT CHANGE UP (ref 39–50)
HGB BLD-MCNC: 14.8 G/DL — SIGNIFICANT CHANGE UP (ref 13–17)
HGB BLD-MCNC: 14.8 G/DL — SIGNIFICANT CHANGE UP (ref 13–17)
LITHIUM SERPL-MCNC: 0.55 MMOL/L — LOW (ref 0.6–1.2)
LITHIUM SERPL-MCNC: 0.55 MMOL/L — LOW (ref 0.6–1.2)
MCHC RBC-ENTMCNC: 30.2 PG — SIGNIFICANT CHANGE UP (ref 27–34)
MCHC RBC-ENTMCNC: 30.2 PG — SIGNIFICANT CHANGE UP (ref 27–34)
MCHC RBC-ENTMCNC: 33.6 GM/DL — SIGNIFICANT CHANGE UP (ref 32–36)
MCHC RBC-ENTMCNC: 33.6 GM/DL — SIGNIFICANT CHANGE UP (ref 32–36)
MCV RBC AUTO: 90 FL — SIGNIFICANT CHANGE UP (ref 80–100)
MCV RBC AUTO: 90 FL — SIGNIFICANT CHANGE UP (ref 80–100)
NRBC # BLD: 0 /100 WBCS — SIGNIFICANT CHANGE UP (ref 0–0)
NRBC # BLD: 0 /100 WBCS — SIGNIFICANT CHANGE UP (ref 0–0)
PLATELET # BLD AUTO: 230 K/UL — SIGNIFICANT CHANGE UP (ref 150–400)
PLATELET # BLD AUTO: 230 K/UL — SIGNIFICANT CHANGE UP (ref 150–400)
POTASSIUM SERPL-MCNC: 4.4 MMOL/L — SIGNIFICANT CHANGE UP (ref 3.5–5.3)
POTASSIUM SERPL-MCNC: 4.4 MMOL/L — SIGNIFICANT CHANGE UP (ref 3.5–5.3)
POTASSIUM SERPL-SCNC: 4.4 MMOL/L — SIGNIFICANT CHANGE UP (ref 3.5–5.3)
POTASSIUM SERPL-SCNC: 4.4 MMOL/L — SIGNIFICANT CHANGE UP (ref 3.5–5.3)
PROT SERPL-MCNC: 6.6 G/DL — SIGNIFICANT CHANGE UP (ref 6–8.3)
PROT SERPL-MCNC: 6.6 G/DL — SIGNIFICANT CHANGE UP (ref 6–8.3)
RBC # BLD: 4.9 M/UL — SIGNIFICANT CHANGE UP (ref 4.2–5.8)
RBC # BLD: 4.9 M/UL — SIGNIFICANT CHANGE UP (ref 4.2–5.8)
RBC # FLD: 11.5 % — SIGNIFICANT CHANGE UP (ref 10.3–14.5)
RBC # FLD: 11.5 % — SIGNIFICANT CHANGE UP (ref 10.3–14.5)
SODIUM SERPL-SCNC: 136 MMOL/L — SIGNIFICANT CHANGE UP (ref 135–145)
SODIUM SERPL-SCNC: 136 MMOL/L — SIGNIFICANT CHANGE UP (ref 135–145)
WBC # BLD: 6.28 K/UL — SIGNIFICANT CHANGE UP (ref 3.8–10.5)
WBC # BLD: 6.28 K/UL — SIGNIFICANT CHANGE UP (ref 3.8–10.5)
WBC # FLD AUTO: 6.28 K/UL — SIGNIFICANT CHANGE UP (ref 3.8–10.5)
WBC # FLD AUTO: 6.28 K/UL — SIGNIFICANT CHANGE UP (ref 3.8–10.5)

## 2023-12-29 PROCEDURE — 85025 COMPLETE CBC W/AUTO DIFF WBC: CPT

## 2023-12-29 PROCEDURE — 83735 ASSAY OF MAGNESIUM: CPT

## 2023-12-29 PROCEDURE — 87040 BLOOD CULTURE FOR BACTERIA: CPT

## 2023-12-29 PROCEDURE — 87186 SC STD MICRODIL/AGAR DIL: CPT

## 2023-12-29 PROCEDURE — 87077 CULTURE AEROBIC IDENTIFY: CPT

## 2023-12-29 PROCEDURE — 80053 COMPREHEN METABOLIC PANEL: CPT

## 2023-12-29 PROCEDURE — 99285 EMERGENCY DEPT VISIT HI MDM: CPT

## 2023-12-29 PROCEDURE — 96365 THER/PROPH/DIAG IV INF INIT: CPT

## 2023-12-29 PROCEDURE — 85652 RBC SED RATE AUTOMATED: CPT

## 2023-12-29 PROCEDURE — 85027 COMPLETE CBC AUTOMATED: CPT

## 2023-12-29 PROCEDURE — 87641 MR-STAPH DNA AMP PROBE: CPT

## 2023-12-29 PROCEDURE — 86140 C-REACTIVE PROTEIN: CPT

## 2023-12-29 PROCEDURE — 85730 THROMBOPLASTIN TIME PARTIAL: CPT

## 2023-12-29 PROCEDURE — 84145 PROCALCITONIN (PCT): CPT

## 2023-12-29 PROCEDURE — 87070 CULTURE OTHR SPECIMN AEROBIC: CPT

## 2023-12-29 PROCEDURE — 85610 PROTHROMBIN TIME: CPT

## 2023-12-29 PROCEDURE — 36415 COLL VENOUS BLD VENIPUNCTURE: CPT

## 2023-12-29 PROCEDURE — 80178 ASSAY OF LITHIUM: CPT

## 2023-12-29 PROCEDURE — 83605 ASSAY OF LACTIC ACID: CPT

## 2023-12-29 PROCEDURE — 87640 STAPH A DNA AMP PROBE: CPT

## 2023-12-29 PROCEDURE — 80048 BASIC METABOLIC PNL TOTAL CA: CPT

## 2023-12-29 RX ORDER — ACETAMINOPHEN 500 MG
2 TABLET ORAL
Qty: 0 | Refills: 0 | DISCHARGE
Start: 2023-12-29

## 2023-12-29 RX ADMIN — CEFEPIME 100 MILLIGRAM(S): 1 INJECTION, POWDER, FOR SOLUTION INTRAMUSCULAR; INTRAVENOUS at 05:19

## 2023-12-29 RX ADMIN — Medication 37.5 MILLIGRAM(S): at 09:38

## 2023-12-29 RX ADMIN — CEFEPIME 100 MILLIGRAM(S): 1 INJECTION, POWDER, FOR SOLUTION INTRAMUSCULAR; INTRAVENOUS at 15:34

## 2023-12-29 NOTE — DISCHARGE NOTE PROVIDER - HOSPITAL COURSE
admitted for RUE cellulitis and right thumb paronychia  underwent I and D  IV antibiotics given per ID  wound culture with staph aureus  DC after ID and hand surgery clearance

## 2023-12-29 NOTE — DISCHARGE NOTE PROVIDER - PROVIDER TOKENS
PROVIDER:[TOKEN:[[id number]]],PROVIDER:[TOKEN:[[id number]]],PROVIDER:[TOKEN:[[id number]]] PROVIDER:[TOKEN:[[id number]]],PROVIDER:[TOKEN:[[id number]]],PROVIDER:[TOKEN:[[id number]]]

## 2023-12-29 NOTE — DISCHARGE NOTE PROVIDER - CARE PROVIDER_API CALL
Mehrdad Duenas  Internal Medicine  [address] [place] [address]  Phone: [phone]  Fax: [phone]  Follow Up Time:     Siria Washington  Plastic Surgery  [address] [place] [address], [address]  [place] [address]  Phone: [phone]  Fax: [phone]  Follow Up Time:     Miracle Coon  Infectious Disease  [address] [place] [address], [address]  [place] [address]  Phone: [phone]  Fax: [phone]  Follow Up Time:    Mehrdad Duenas  Internal Medicine  [address] [place] [address]  Phone: [phone]  Fax: [phone]  Follow Up Time:     Siria Washington  Plastic Surgery  [address] [place] [address], [address]  [place] [address]  Phone: [phone]  Fax: [phone]  Follow Up Time:     Miracle Coon  Infectious Disease  [address] [place] [address], [address]  [place] [address]  Phone: [phone]  Fax: [phone]  Follow Up Time:

## 2023-12-29 NOTE — PROGRESS NOTE ADULT - ASSESSMENT
Pt is a 28M w/ PMHx of bipolar disorder presents with right thumb infection over past 4 to 5 days.  Patient has been on p.o. doxycycline over past 4 days. Today patient noticed redness in the hand and spreading up the forearm. He also has increased discomfort to the thumb.     R hand Cellulitis  failed outpatient po antibiotic therapy. Now erythema spreading up the right hand and forearm  Patient had paronychia of the medial aspect of the right thumb which was I&D'd by ER --> small amount of pus drained. Cultures sent.  - MRSA swab negative  - BCx/UCx NGTD  - WCx Multiple s. aureus    Recommendations:   C/w cefepime for now  Can switch to PO bactrim 1 DS tab BID to complete x10 day course until 1/6/23  Appreciate hand recs; continue warm soaks    Patient would like to go home if possible today  Stable from ID standpoint  D/c planning per primary team  Pt can call [phone] to follow in office    D/w pt and mom ANGELINA Talavera at bedside  Infectious Diseases will continue to follow. Please call with any questions.   Miracle Coon M.D.  [facility] Division of Infectious [facility] [phone]  For after 5 P.M. and weekends, please call [phone]   Pt is a 28M w/ PMHx of bipolar disorder presents with right thumb infection over past 4 to 5 days.  Patient has been on p.o. doxycycline over past 4 days. Today patient noticed redness in the hand and spreading up the forearm. He also has increased discomfort to the thumb.     R hand Cellulitis  failed outpatient po antibiotic therapy. Now erythema spreading up the right hand and forearm  Patient had paronychia of the medial aspect of the right thumb which was I&D'd by ER --> small amount of pus drained. Cultures sent.  - MRSA swab negative  - BCx/UCx NGTD  - WCx Multiple s. aureus    Recommendations:   C/w cefepime for now  Can switch to PO bactrim 1 DS tab BID to complete x10 day course until 1/6/23  Appreciate hand recs; continue warm soaks    Patient would like to go home if possible today  Stable from ID standpoint  D/c planning per primary team  Pt can call [phone] to follow in office    D/w pt and mom ANGELINA Talavera at bedside  Infectious Diseases will continue to follow. Please call with any questions.   Miracle Coon M.D.  [facility] Infectious [facility] [phone]  For after 5 P.M. and weekends, please call [phone]   Pt is a 28M w/ PMHx of bipolar disorder presents with right thumb infection over past 4 to 5 days.  Patient has been on p.o. doxycycline over past 4 days. Today patient noticed redness in the hand and spreading up the forearm. He also has increased discomfort to the thumb.     R hand Cellulitis  failed outpatient po antibiotic therapy. Now erythema spreading up the right hand and forearm  Patient had paronychia of the medial aspect of the right thumb which was I&D'd by ER --> small amount of pus drained. Cultures sent.  - MRSA swab negative  - BCx/UCx NGTD  - WCx Multiple s. aureus    Recommendations:   C/w cefepime for now  Can switch to PO bactrim 1 DS tab BID to complete x10 day course until 1/6/23  Appreciate hand recs; continue warm soaks    Patient would like to go home if possible today  Stable from ID standpoint  D/c planning per primary team  Pt can call [phone] to follow in office    D/w pt and mom ANGELINA Talavera at bedside  D/w Dr. Ric Vidal covering weekend service 12/30-1/1  Infectious Diseases will continue to follow. Please call with any questions.   Miracle Coon M.D.  [facility] of Infectious [facility] [phone]  For after 5 P.M. and weekends, please call [phone]     Pt is a 28M w/ PMHx of bipolar disorder presents with right thumb infection over past 4 to 5 days.  Patient has been on p.o. doxycycline over past 4 days. Today patient noticed redness in the hand and spreading up the forearm. He also has increased discomfort to the thumb.     R hand Cellulitis  failed outpatient po antibiotic therapy. Now erythema spreading up the right hand and forearm  Patient had paronychia of the medial aspect of the right thumb which was I&D'd by ER --> small amount of pus drained. Cultures sent.  - MRSA swab negative  - BCx/UCx NGTD  - WCx Multiple s. aureus    Recommendations:   C/w cefepime for now  Can switch to PO bactrim 1 DS tab BID to complete x10 day course until 1/6/23  Appreciate hand recs; continue warm soaks    Patient would like to go home if possible today  Stable from ID standpoint  D/c planning per primary team  Pt can call [phone] to follow in office    D/w pt and mom ANGELINA Talavera at bedside  D/w Dr. Ric Vidal covering weekend service 12/30-1/1  Infectious Diseases will continue to follow. Please call with any questions.   Miracle Coon M.D.  [facility] Division of Infectious [facility] [phone]  For after 5 P.M. and weekends, please call [phone]

## 2023-12-29 NOTE — DISCHARGE NOTE NURSING/CASE MANAGEMENT/SOCIAL WORK - NSDCPEFALRISK_GEN_ALL_CORE
For information on Fall & Injury Prevention, visit: [email or web address] OR  [email or web address] OR  https://www.cdc.gov/steadi/patient.html For information on Fall & Injury Prevention, visit: [email or web address]/[email or web address]-mobility OR  [email or web address]/[email or web address]-[email or web address]-avoid-injury OR  https://www.cdc.gov/steadi/patient.html

## 2023-12-29 NOTE — DISCHARGE NOTE PROVIDER - NSDCFUSCHEDAPPT_GEN_ALL_CORE_FT
[facility]  PSYCHIATRY [id number]  Scheduled Appointment: 01/02/2024     [facility]  PSYCHIATRY [address]  Scheduled Appointment: 01/02/2024

## 2023-12-29 NOTE — DISCHARGE NOTE NURSING/CASE MANAGEMENT/SOCIAL WORK - PATIENT PORTAL LINK FT
You can access the FollowMyHealth Patient Portal offered by [facility] by registering at the following website: [email or web address]. By joining [employer]’s FollowMyHealth portal, you will also be able to view your health information using other applications (apps) compatible with our system. You can access the FollowMyHealth Patient Portal offered by [facility] by registering at the following website: [email or web address]. By joining [employer]’s FollowMyHealth portal, you will also be able to view your health information using other applications (apps) compatible with our system.

## 2023-12-29 NOTE — DISCHARGE NOTE PROVIDER - NSDCMRMEDTOKEN_GEN_ALL_CORE_FT
acetaminophen 325 mg oral tablet: 2 tab(s) orally every 6 hours As needed Temp greater or equal to 38C (100.4F), Mild Pain (1 - 3)  Depakote 250 mg oral delayed release tablet: 5 tab(s) orally once a day (at bedtime) Total 1250mg qhs  Effexor 37.5 mg oral tablet: 1 tab(s) orally once a day  lithium 150 mg oral capsule: 1 cap(s) orally once a day (at bedtime) Total 750mg qhs  lithium 600 mg oral capsule: 1 cap(s) orally once a day (at bedtime) Total 750mg qhs

## 2023-12-29 NOTE — PROGRESS NOTE ADULT - SUBJECTIVE AND OBJECTIVE BOX
Appreciate all efforts.  C/O little to no pain.  VSS  Right thumb wound healing up nicely wiyh minimal to no tenderness. No erythema, fluctuance or remaining lymphangitis.  FRO.  N&V intact.  Aware or culture results.  Reassured.  No indication for further acute surgical intervention unless infx recurs.  Discussed at length.  Discharge when cleared by ID and on abx as per ID.  F/u next wk as outpt.  Please call me prn while here.

## 2023-12-29 NOTE — DISCHARGE NOTE PROVIDER - NSDCCPCAREPLAN_GEN_ALL_CORE_FT
PRINCIPAL DISCHARGE DIAGNOSIS  Diagnosis: Paronychia of right thumb  Assessment and Plan of Treatment: follow up with Hand surgeon Dr. Herrera      SECONDARY DISCHARGE DIAGNOSES  Diagnosis: Infectious lymphangitis  Assessment and Plan of Treatment:

## 2023-12-29 NOTE — PROGRESS NOTE ADULT - SUBJECTIVE AND OBJECTIVE BOX
[facility], Division of Infectious Diseases  BRADLEY Pavon Y. Patel, S. Shah, G. [facility]  [phone]    Name: NIXON ESCAMILLA  Age: 28y  Gender: Male  MRN: [id number]    Interval History:  Patient seen and examined at bedside  No acute overnight events.   Notes reviewed    Antibiotics:  cefepime   IVPB 2000 milliGRAM(s) IV Intermittent every 12 hours  cefepime   IVPB          Medications:  acetaminophen     Tablet .. 650 milliGRAM(s) Oral every 6 hours PRN  aluminum hydroxide/magnesium hydroxide/simethicone Suspension 30 milliLiter(s) Oral every 4 hours PRN  cefepime   IVPB      cefepime   IVPB 2000 milliGRAM(s) IV Intermittent every 12 hours  divalproex ER 1250 milliGRAM(s) Oral <User Schedule>  lithium 750 milliGRAM(s) Oral <User Schedule>  melatonin 3 milliGRAM(s) Oral at bedtime PRN  ondansetron Injectable 4 milliGRAM(s) IV Push every 6 hours PRN  venlafaxine XR 37.5 milliGRAM(s) Oral daily      Review of Systems:  A 10-point review of systems was obtained.     Pertinent positives and negatives--  Constitutional: No fevers. No Chills. No Rigors.   Cardiovascular: No chest pain. No palpitations.  Respiratory: No shortness of breath. No cough.  Gastrointestinal: No nausea or vomiting. No diarrhea or constipation.   Psychiatric: Pleasant. Appropriate affect.    Review of systems otherwise negative except as previously noted.    Allergies: Nuts (Anaphylaxis)  No Known Drug Allergies    For details regarding the patient's past medical history, social history, family history, and other miscellaneous elements, please refer the initial infectious diseases consultation and/or the admitting history and physical examination for this admission.    Objective:  Vitals:   T(C): 36.5 (12-29-23 @ 05:06), Max: 37 (12-28-23 @ 21:29)  HR: 86 (12-29-23 @ 05:06) (63 - 86)  BP: 114/76 (12-29-23 @ 05:06) (103/66 - 114/76)  RR: 18 (12-29-23 @ 05:06) (16 - 18)  SpO2: 96% (12-29-23 @ 05:06) (92% - 96%)    Physical Examination:  General: no acute distress  HEENT: NC/AT, EOMI, anicteric, no oral lesions  Neck: supple, no palpable LAD  Cardio: S1, S2 heard, RRR, no murmurs  Resp: breath sounds heard bilaterally, no rales, wheezes or rhonchi  Abd: soft, NT, ND, + bowel sounds  Neuro: no obvious focal deficits  Ext: no edema or cyanosis  Skin: warm, dry, no visible rash      Laboratory Studies:  CBC:                       14.2   6.85  )-----------( 212      ( 28 Dec 2023 06:00 )             41.6     CMP: 12-28    139  |  107  |  15  ----------------------------<  99  3.9   |  25  |  0.96    Ca    9.1      28 Dec 2023 06:00  Mg     2.0     12-28    TPro  6.6  /  Alb  3.9  /  TBili  0.2  /  DBili  x   /  AST  30  /  ALT  34  /  AlkPhos  43  12-27    LIVER FUNCTIONS - ( 27 Dec 2023 18:29 )  Alb: 3.9 g/dL / Pro: 6.6 g/dL / ALK PHOS: 43 U/L / ALT: 34 U/L / AST: 30 U/L / GGT: x           Urinalysis Basic - ( 28 Dec 2023 06:00 )    Color: x / Appearance: x / SG: x / pH: x  Gluc: 99 mg/dL / Ketone: x  / Bili: x / Urobili: x   Blood: x / Protein: x / Nitrite: x   Leuk Esterase: x / RBC: x / WBC x   Sq Epi: x / Non Sq Epi: x / Bacteria: x        Microbiology: reviewed    Culture - Other (collected 12-27-23 @ 19:19)  Source: Wound Wound  Preliminary Report (12-29-23 @ 08:10):    Numerous Staphylococcus aureus    Culture - Blood (collected 12-27-23 @ 18:29)  Source: .Blood Blood-Peripheral  Preliminary Report (12-29-23 @ 02:03):    No growth at 24 hours    Culture - Blood (collected 12-27-23 @ 18:29)  Source: .Blood Blood-Peripheral  Preliminary Report (12-29-23 @ 02:03):    No growth at 24 hours          Radiology: reviewed       [facility], Division of Infectious Diseases  [facility] Pavon Y. Patel, S. Shah, G. [facility]  [phone]    Name: NIXON ESCAMILLA  Age: 28y  Gender: Male  MRN: [id number]    Interval History:  Patient seen and examined at bedside  No acute overnight events.   Notes reviewed    Antibiotics:  cefepime   IVPB 2000 milliGRAM(s) IV Intermittent every 12 hours  cefepime   IVPB          Medications:  acetaminophen     Tablet .. 650 milliGRAM(s) Oral every 6 hours PRN  aluminum hydroxide/magnesium hydroxide/simethicone Suspension 30 milliLiter(s) Oral every 4 hours PRN  cefepime   IVPB      cefepime   IVPB 2000 milliGRAM(s) IV Intermittent every 12 hours  divalproex ER 1250 milliGRAM(s) Oral <User Schedule>  lithium 750 milliGRAM(s) Oral <User Schedule>  melatonin 3 milliGRAM(s) Oral at bedtime PRN  ondansetron Injectable 4 milliGRAM(s) IV Push every 6 hours PRN  venlafaxine XR 37.5 milliGRAM(s) Oral daily      Review of Systems:  A 10-point review of systems was obtained.     Pertinent positives and negatives--  Constitutional: No fevers. No Chills. No Rigors.   Cardiovascular: No chest pain. No palpitations.  Respiratory: No shortness of breath. No cough.  Gastrointestinal: No nausea or vomiting. No diarrhea or constipation.   Psychiatric: Pleasant. Appropriate affect.    Review of systems otherwise negative except as previously noted.    Allergies: Nuts (Anaphylaxis)  No Known Drug Allergies    For details regarding the patient's past medical history, social history, family history, and other miscellaneous elements, please refer the initial infectious diseases consultation and/or the admitting history and physical examination for this admission.    Objective:  Vitals:   T(C): 36.5 (12-29-23 @ 05:06), Max: 37 (12-28-23 @ 21:29)  HR: 86 (12-29-23 @ 05:06) (63 - 86)  BP: 114/76 (12-29-23 @ 05:06) (103/66 - 114/76)  RR: 18 (12-29-23 @ 05:06) (16 - 18)  SpO2: 96% (12-29-23 @ 05:06) (92% - 96%)    Physical Examination:  General: no acute distress  HEENT: NC/AT, EOMI, anicteric, no oral lesions  Neck: supple, no palpable LAD  Cardio: S1, S2 heard, RRR, no murmurs  Resp: breath sounds heard bilaterally, no rales, wheezes or rhonchi  Abd: soft, NT, ND, + bowel sounds  Neuro: no obvious focal deficits  Ext: no edema or cyanosis  Skin: warm, dry, no visible rash      Laboratory Studies:  CBC:                       14.2   6.85  )-----------( 212      ( 28 Dec 2023 06:00 )             41.6     CMP: 12-28    139  |  107  |  15  ----------------------------<  99  3.9   |  25  |  0.96    Ca    9.1      28 Dec 2023 06:00  Mg     2.0     12-28    TPro  6.6  /  Alb  3.9  /  TBili  0.2  /  DBili  x   /  AST  30  /  ALT  34  /  AlkPhos  43  12-27    LIVER FUNCTIONS - ( 27 Dec 2023 18:29 )  Alb: 3.9 g/dL / Pro: 6.6 g/dL / ALK PHOS: 43 U/L / ALT: 34 U/L / AST: 30 U/L / GGT: x           Urinalysis Basic - ( 28 Dec 2023 06:00 )    Color: x / Appearance: x / SG: x / pH: x  Gluc: 99 mg/dL / Ketone: x  / Bili: x / Urobili: x   Blood: x / Protein: x / Nitrite: x   Leuk Esterase: x / RBC: x / WBC x   Sq Epi: x / Non Sq Epi: x / Bacteria: x        Microbiology: reviewed    Culture - Other (collected 12-27-23 @ 19:19)  Source: Wound Wound  Preliminary Report (12-29-23 @ 08:10):    Numerous Staphylococcus aureus    Culture - Blood (collected 12-27-23 @ 18:29)  Source: .Blood Blood-Peripheral  Preliminary Report (12-29-23 @ 02:03):    No growth at 24 hours    Culture - Blood (collected 12-27-23 @ 18:29)  Source: .Blood Blood-Peripheral  Preliminary Report (12-29-23 @ 02:03):    No growth at 24 hours          Radiology: reviewed

## 2023-12-30 ENCOUNTER — TRANSCRIPTION ENCOUNTER (OUTPATIENT)
Age: 28
End: 2023-12-30

## 2023-12-30 LAB
-  AMPICILLIN/SULBACTAM: SIGNIFICANT CHANGE UP
-  AMPICILLIN/SULBACTAM: SIGNIFICANT CHANGE UP
-  CEFAZOLIN: SIGNIFICANT CHANGE UP
-  CEFAZOLIN: SIGNIFICANT CHANGE UP
-  CLINDAMYCIN: SIGNIFICANT CHANGE UP
-  CLINDAMYCIN: SIGNIFICANT CHANGE UP
-  ERYTHROMYCIN: SIGNIFICANT CHANGE UP
-  ERYTHROMYCIN: SIGNIFICANT CHANGE UP
-  GENTAMICIN: SIGNIFICANT CHANGE UP
-  GENTAMICIN: SIGNIFICANT CHANGE UP
-  OXACILLIN: SIGNIFICANT CHANGE UP
-  OXACILLIN: SIGNIFICANT CHANGE UP
-  RIFAMPIN: SIGNIFICANT CHANGE UP
-  RIFAMPIN: SIGNIFICANT CHANGE UP
-  TETRACYCLINE: SIGNIFICANT CHANGE UP
-  TETRACYCLINE: SIGNIFICANT CHANGE UP
-  TRIMETHOPRIM/SULFAMETHOXAZOLE: SIGNIFICANT CHANGE UP
-  TRIMETHOPRIM/SULFAMETHOXAZOLE: SIGNIFICANT CHANGE UP
-  VANCOMYCIN: SIGNIFICANT CHANGE UP
-  VANCOMYCIN: SIGNIFICANT CHANGE UP
CULTURE RESULTS: ABNORMAL
CULTURE RESULTS: ABNORMAL
METHOD TYPE: SIGNIFICANT CHANGE UP
METHOD TYPE: SIGNIFICANT CHANGE UP
ORGANISM # SPEC MICROSCOPIC CNT: ABNORMAL
SPECIMEN SOURCE: SIGNIFICANT CHANGE UP
SPECIMEN SOURCE: SIGNIFICANT CHANGE UP

## 2024-01-01 NOTE — ED ADULT TRIAGE NOTE - PAIN RATING/NUMBER SCALE (0-10): ACTIVITY
Problem: Infant Inpatient Plan of Care  Goal: Plan of Care Review  Outcome: Progressing  Goal: Patient-Specific Goal (Individualized)  Outcome: Progressing  Goal: Absence of Hospital-Acquired Illness or Injury  Outcome: Progressing  Goal: Optimal Comfort and Wellbeing  Outcome: Progressing  Goal: Readiness for Transition of Care  Outcome: Progressing     Problem: [place]  Goal: Optimal Circumcision Site Healing  Outcome: Progressing  Goal: Glucose Stability  Outcome: Progressing  Goal: Demonstration of Attachment Behaviors  Outcome: Progressing  Goal: Absence of Infection Signs and Symptoms  Outcome: Progressing  Goal: Effective Oral Intake  Outcome: Progressing  Goal: Optimal Level of Comfort and Activity  Outcome: Progressing  Goal: Effective Oxygenation and Ventilation  Outcome: Progressing  Goal: Skin Health and Integrity  Outcome: Progressing  Goal: Temperature Stability  Outcome: Progressing      0 (no pain/absence of nonverbal indicators of pain)

## 2024-01-02 ENCOUNTER — APPOINTMENT (OUTPATIENT)
Dept: PSYCHIATRY | Facility: CLINIC | Age: 29
End: 2024-01-02
Payer: MEDICAID

## 2024-01-02 LAB
CULTURE RESULTS: SIGNIFICANT CHANGE UP
SPECIMEN SOURCE: SIGNIFICANT CHANGE UP

## 2024-01-02 PROCEDURE — 96132 NRPSYC TST EVAL PHYS/QHP 1ST: CPT

## 2024-01-02 PROCEDURE — 96136 PSYCL/NRPSYC TST PHY/QHP 1ST: CPT

## 2024-01-02 PROCEDURE — 96137 PSYCL/NRPSYC TST PHY/QHP EA: CPT

## 2024-01-02 PROCEDURE — 96133 NRPSYC TST EVAL PHYS/QHP EA: CPT

## 2024-01-02 NOTE — REASON FOR VISIT
[Patient preference] : as per patient preference [Telehealth (audio & video) - Individual/Group] : This visit was provided via telehealth using real-time 2-way audio visual technology. [Medical Office: ([facility])___] : The provider was located at the medical office in [unfilled]. [Home] : The patient, [unfilled], was located at home, [unfilled], at the time of the visit. [Feedback of results of neuropsychological evaluation] : Feedback of results of neuropsychological evaluation [Patient with collateral] : Patient with collateral  [Mother] : mother [FreeTextEntry1] : Neuropsychological Evaluation Report Personal Information	Evaluation Information Name: Luís Wiley	Date of Interview: 11/27/2023 YOB: 1995	Date of Evaluation: 12/05/2023 Racial/Ethnic Identity: White 	Referring Provider: Darin Cloud (psychiatry) Primary Language: English	Provider: Franco Oquendo, Ph.D. Education: 17 years	Examiner: Suzette Whittaker M.A.  The following information was obtained during an interview with Lisandro Wiley and his mother, Norma Wiley, a review of medical records, and a psychometric evaluation (see appendix). ________________________________________  Referral & Background Lisandro Wiley is a 28-year-old, right-handed male with a history of developmental delays, academic difficulties, and bipolar disorder. He was also diagnosed with a nonverbal learning disorder and attention deficit/hyperactivity disorder (ADHD) in the context of a neuropsychological evaluation in December 2021. He presented for neuropsychological re-evaluation for additional diagnostic clarification regarding longstanding social-emotional difficulties that have recently significantly impacted his functioning. Specifically, he reported severe social anxiety and feeling "awkward and misunderstood" in social situations at work. Mr. Wiley experienced panic attacks from these experiences resulting in numerous ED visits, the last of which was in October when he resigned from work.   He also reported the following cognitive concerns: -	Executive Functioning: Occasionally impulsive; for example, while driving or speaking before thinking. Difficulties with problem solving when situations become emotionally overwhelming. In these situations, he may "shut down." This occurs in all areas of his life.  -	Language: Difficulty understanding broad concepts. He struggles with verbally expressing himself and using the words he means when he is uncomfortable. -	Visuospatial: Lacks awareness of where other people and things are in space in relation to his body. He frequently bumps into things.  -	Memory: Poor memory of childhood and past events. He also has difficulty remembering more recent events, such as details about what he did yesterday, but he is able to recall with cues.   Previous Evaluation / Work-Up: -	Neuropsychological Evaluation (12/13/2021) completed at [facility]: o	"Luís demonstrated difficulty identifying facial emotions and reported limited ability to  on social cues and navigate social interactions. Combined with demonstrated difficulties in problem solving, visuospatial functioning, and organization, Luís's presentation was consistent with a diagnosis of nonverbal learning disability." o	Mr. 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   Developmental History:  He was born full-term following an unremarkable pregnancy. Developmental delays emerged in language and gross motor skills. Specifically, he was diagnosed with low tone on the right side of his body. He first began speaking consistently at age [age]. He received early interventions including occupational and speech therapy.   Medical History:  -	Asthma -	Migraines -	Nut allergy -	Urticaria  Psychiatric History:  -	Anxiety -	Bipolar disorder -	He reported his current mood to be melancholic. He is currently treated by psychiatrist Dr. Darin Cloud.   Substance Use:  Denied a history of substance use.  Current Medications:  -	Depakote 1250mg -	Effexor 150mg -	Lithium 600mg -	Omega 3 3500mg  Family History: -	Attention-Deficit / Hyperactivity Disorder (ADHD) -	Autism spectrum disorder -	Anxiety  -	Depression  Educational History:  Mr. Wiley attended a [employer] for [employer] and special needs children. He was in a self-contained class during  and used a cochlear earpiece to assist in following directions. He received special education until the third grade, afterwards he was in an inclusion class with a resource period. He opted out of special education in high school and received an IEP that included extended time (1.5x) on examinations and a separate location. Mr. Wiley graduated from high school and obtained a Bachelor of Arts from [employer] in 2020. He is currently enrolled part-time at [employer] for a Master's in Health Administration, expected graduation date is December 2024. He takes two courses per semester. He is not receiving academic accommodations in this program.  Psychosocial History: Mr. Wiley is currently living at home with his mother. His parents are  and he has one younger brother. He has two friends he is in contact with who live out-of-state. ________________________________________ Presentation  Appearance:  -	Presented on time to the session, accompanied by his mother.  -	Appeared his chronological age. Appropriately dressed and groomed. -	Eye contact was well maintained and appropriate.  Sensory and Motor:  -	Ambulated independently and without any significant abnormality.  -	Visual acuity and hearing appeared intact.   Speech and Language:  -	Receptive language was adequate, though he occasionally required rephrasing to accurately understand questions. -	Expressive speech was normal for rate, volume, and tone.  Thought Processes:  -	Linear and logical.  Mood and Affect:  -	He described low mood. Affect was somewhat flat.  Engagement:  -	Engaged appropriately during the evaluation.  -	Results are believed to be a valid estimate of Mr. Wiley's current cognitive functioning. ________________________________________  Results Regarding specific areas of functioning: Attention:  -	Intact basic auditory attention.  Processing Speed:  -	Processing speed was intact overall. He showed some variability, but he demonstrated capacity for within normal limits.  Executive Functioning:  -	Difficulties with set shifting, sequencing, inhibition, switching, initiation, and novel problem solving.  Visuospatial Functioning:  -	Simple visuoperception was intact. -	Pronounced deficits in visuoconstruction and visual integration were demonstrated.  Language: -	Confrontation naming was within normal limits. -	Generative fluencies were impaired.    Memory: -	Poor encoding across modalities, with encoding of nonverbal information worse than verbal information. Recall of verbal and nonverbal information was further impaired.    Social Functioning:  -	Social perception was impaired; Mr. Wiley had significant difficulty identifying emotions, facial expressions, and understanding the meaning of prosody.  -	Mr. Wiley and his mother reported persistent difficulties with verbal and nonverbal communication that have significantly impacted his social relationships and occupation. The onset of these difficulties began in early childhood. For example, his mother recalled that as a child (beginning around age [age]), he had trouble making eye contact, did not smile, and was nonreactive to other's emotions. He had frequent tantrums, and would rarely initiate social interactions with peers.   Behaviors: -	Interview with Mr. Wiley and his mother revealed that he does not have a history of restricted interested or repetitive behaviors.  ________________________________________  Impressions In summary, Mr. Wiley's cognitive profile indicated significant impairment in visuospatial functioning, memory (encoding of nonverbal information worse than verbal) and executive functioning. Regarding social functioning, Mr. Wiley has significant difficulties with verbal and nonverbal communication that have considerably impacted functioning.  Compared to his 2021 evaluation, he showed further decline in multiple aspects of executive functioning, including set shifting, sequencing, inhibition, switching, and initiation. Recall of verbal information in story format also further declined, but memory was otherwise stable and below age expectations. Visuospatial functioning and social perception continue to be impaired.    The current evaluation reveals that Mr. Wiley presents with a diagnosis of Social Pragmatic Communication Disorder (SPCD). This diagnosis is supported by his longstanding and pervasive difficulties in understanding and utilizing verbal and nonverbal communication in social contexts. His reported severe social anxiety, panic attacks, and a history of feeling "awkward and misunderstood" align with the hallmark features of SPCD. Notably, his impaired social perception, as evidenced by significant difficulty identifying emotions and facial expressions, further underscores the challenges he faces navigating social interactions. The onset of these difficulties in early childhood, as reported by both Mr. Wiley and his mother, aligns with the developmental nature of SPCD. These challenges continue to significantly impact his occupational and social functioning, as evidenced by his recent resignation from work due to panic attacks triggered by social situations.  Results continue to support the previously diagnosed Nonverbal Learning Disorder (NVLD). His cognitive profile revealed pronounced deficits in visuospatial functioning, executive functioning, and memory, consistent with the characteristics of NVLD. His impaired awareness of where people and objects are in space, frequent bumping into things, and struggles with visuoconstruction and visual integration further support the diagnosis of NVLD. The stability of these deficits over time, coupled with a decline in certain aspects of executive functioning, underscores the persistent nature of NVLD and its impact on Mr. Wiley's daily functioning.  Mr. Wiley also continues to meet criteria for Attention-Deficit/Hyperactivity Disorder (ADHD), inattentive subtype, supported by reports of lifelong difficulties with concentration, impulsivity, and initiation, as well as the pronounced impairments in executive functioning on the present evaluation.  The above neurodevelopmental disorders exhibit certain shared neurobiological features, providing insights into their complex interplay. One prominent aspect involves structural and functional abnormalities in the white matter tracts that serve as communication pathways, especially within the right hemisphere of the brain. Research indicates that disruptions in white matter integrity, affecting the efficiency of neural communication, may contribute to the observed cognitive and social challenges in these disorders. The right hemisphere, known for its involvement in social processing, visuospatial skills, and executive functions, appears particularly vulnerable in Mr. Wiley's case. This shared neurobiological vulnerability could explain the overlap in symptoms and the cumulative impact on Mr. Wiley's cognitive and social functioning.  Of note, Mr. Wiley does not seem to meet criteria for a diagnosis of Autism Spectrum Disorder (ASD). Unlike individuals with ASD, Mr. Wiley does not have a developmental history of, nor current indications of, restricted interests or repetitive behaviors, as confirmed through interviews with both him and his mother. The absence of these core characteristics, coupled with the distinctive profiles of SPCD, NVLD and ADHD, supports the rule-out of an ASD diagnosis. ________________________________________  Diagnosis -	F80.89 Social (Pragmatic) Communication Disorder -	F90.0 Attention Deficit/Hyperactivity Disorder, predominantly inattentive presentation  ________________________________________  Recommendations 1.	Continue pharmacotherapy treatment for mood symptoms:  -	Mr. Wiley is encouraged to continue his ongoing pharmacological treatment for his mood symptoms.  2.	Intervention for social communication difficulties: It is recommended that Mr. Wiley engage in targeting psychotherapy and/or interventions that directly address his symptoms related to SPCD.   -	Social communication interventions: Implement evidence-based social communication interventions, such as Social Thinking or Social Communication, Emotional Regulation, and Transactional Support (SCERTS). These programs can provide structured frameworks for improving Mr. Wiley's social communication skills, enhancing his ability to understand and navigate social interactions. o	Book: Social Thinking at Work: Strategies for Understanding and Navigating the Workplace 2nd Edition -	Individualized social skills training: Work with a psychotherapist who can implement individualized social skills training program that targets specific areas of difficulty (including interpreting facial expressions, understanding nonverbal cues, and initiating and maintaining conversations). Tailor the training to address Mr. Wiley's unique challenges in social pragmatics. -	Cognitive Behavioral Therapy (CBT) for social anxiety: Given Mr. Wiley's reported severe social anxiety, consider incorporating CBT techniques to address and manage anxiety related to social interactions. This may involve cognitive restructuring, exposure therapy, and anxiety reduction strategies to improve his overall comfort and confidence in social situation. -	Social skills group therapy: Enroll Mr. Wiley in social skills group therapy to provide structured opportunities for practicing and refining social communication skills in a supportive and controlled environment. Group settings can facilitate real-time feedback and social learning.   3.	As requested by Mr. Wiley, below are web links to the following [facility]: -	[facility] in Bipolar Disorder -	[facility] [employer] [facility]   4.	Nonverbal learning disorder resources: -	https://nvld.org  -	Book: Misnamed, Misdiagnosed, Misunderstood: Recognizing and Coping with NVLD (Nonverbal Learning Disorder) from Childhood Through Adulthood  5.	Academic accommodations. Mr. Wiley is eligible to receive academic accommodations that may support his diagnoses of NVLD, ADHD and psychiatric conditions. -	Extended test-taking time: Providing additional time for exams and assignments to accommodate potential difficulties in time management and processing speed. -	Quiet or distraction-free environment: Allowing Mr. Wiley to take tests or complete assignments in a quiet or distraction-free space to minimize external stimuli that may disrupt concentration. -	Preferential seating: Placing Mr. Wiley in a location within the classroom that minimizes distractions and enhances focus, such as near the front of the class. -	Use of visual aids: Providing visual aids, such as charts, graphs, or diagrams, to enhance understanding and retention of information. -	Assignment deadline extensions: Allowing flexibility with assignment deadlines to accommodate challenges with time management. -	Structured organization strategies: Implementing organization tools and strategies, such as planners or checklists, to help the student manage and prioritize tasks.  6.	Utilize compensatory strategies to aid executive functioning and memory: -	Mr. Wiley will likely benefit from high levels of structure and routine in his life. He may find that consistency in placing objects where they belong and completing tasks in a given order may improve task completion. -	Use organizational tools such as planners, calendars, and alarms to keep track of appointments and day-to-day activities and facilitate completion of daily activities. -	Reduce multitasking demands, break tasks down into manageable steps, and keep a list of these steps to reduce memory demands and distraction.  -	Strive to complete one task at a time to reduce the burden on executive functions. -	Create a quiet work environment and fully attend to one activity at a time. If someone is speaking, look at him or her directly.  -	Others should provide information in a short, clear, and structured manner when communicating with Mr. Wiley. Written reminders may be helpful.

## 2024-03-20 PROBLEM — F31.9 BIPOLAR DISORDER, UNSPECIFIED: Chronic | Status: ACTIVE | Noted: 2023-12-27

## 2024-05-22 ENCOUNTER — APPOINTMENT (OUTPATIENT)
Dept: PULMONOLOGY | Facility: CLINIC | Age: 29
End: 2024-05-22
Payer: MEDICAID

## 2024-05-22 VITALS
WEIGHT: 203 LBS | HEIGHT: 73 IN | OXYGEN SATURATION: 97 % | TEMPERATURE: 97.9 F | SYSTOLIC BLOOD PRESSURE: 130 MMHG | HEART RATE: 81 BPM | BODY MASS INDEX: 26.9 KG/M2 | DIASTOLIC BLOOD PRESSURE: 82 MMHG | RESPIRATION RATE: 16 BRPM

## 2024-05-22 DIAGNOSIS — R06.02 SHORTNESS OF BREATH: ICD-10-CM

## 2024-05-22 DIAGNOSIS — J30.2 OTHER SEASONAL ALLERGIC RHINITIS: ICD-10-CM

## 2024-05-22 DIAGNOSIS — J45.909 UNSPECIFIED ASTHMA, UNCOMPLICATED: ICD-10-CM

## 2024-05-22 PROCEDURE — 99214 OFFICE O/P EST MOD 30 MIN: CPT

## 2024-05-22 NOTE — REVIEW OF SYSTEMS
[Postnasal Drip] : postnasal drip [Cough] : cough [Sputum] : sputum [SOB on Exertion] : sob on exertion [Watery Eyes] : watery eyes [Itchy Eyes] : itchy eyes [Seasonal Allergies] : seasonal allergies [Negative] : Endocrine

## 2024-05-22 NOTE — PHYSICAL EXAM
[No Acute Distress] : no acute distress [Normal Oropharynx] : normal oropharynx [Normal Appearance] : normal appearance [Supple] : supple [No Neck Mass] : no neck mass [Normal S1, S2] : normal s1, s2 [No Rubs] : no rubs [No Gallops] : no gallops [No Resp Distress] : no resp distress [Clear to Auscultation Bilaterally] : clear to auscultation bilaterally [No Abnormalities] : no abnormalities [Benign] : benign [Not Tender] : not tender [Soft] : soft [Normal Bowel Sounds] : normal bowel sounds [Normal Gait] : normal gait [No Clubbing] : no clubbing [No Cyanosis] : no cyanosis [No Edema] : no edema [FROM] : FROM [Normal Color/ Pigmentation] : normal color/ pigmentation [No Focal Deficits] : no focal deficits [Oriented x3] : oriented x3 [Normal Mood] : normal mood [Normal Affect] : normal affect

## 2024-05-22 NOTE — HISTORY OF PRESENT ILLNESS
[TextBox_4] : Mr. ESCAMILLA is a 28 year old male presenting to the office today for an acute visit for cough. His chief complaint is:  -reports he has had a productive cough with yellow sputum for the past 2 days -reports itchy throat and watery eyes -reports headaches and lightheadedness have resolved -reports he has seasonal allergies  -reports his symptoms are improving   -He denies any visual issues, nausea, vomiting, fever, chills, sweats, chest pains, chest pressure, diarrhea, constipation, dysphagia, myalgia, dizziness, leg swelling, leg pain, heartburn, reflux, or sour taste in the mouth.

## 2024-05-22 NOTE — ASSESSMENT
[FreeTextEntry1] : Mr. ESCAMILLA is a 28 year old male with a history of OCD, depression, asthma, allergies, urticaria, s/p COVID-19 6/2022 who now comes to the office for an acute visit for SOB, seasonal allergies.  Problem 1: Mild and intermittent asthma -(Unable) Symbicort 160 2 inhalations BID -(Unable) Nebulizer Albuterol/ Budesonide 0.5 mg BID -Ventolin 2 puffs Q6H, pre-exercise  Problem 2: seasonal allergies -add Claritin and Flonase -recommend allergy shots -ordered RVP and sputum culture - Environmental measures for allergies were encouraged including mattress and pillow cover, air purifier, and environmental controls.  follow up in 4 months sooner as needed Encouraged to call with questions/concerns

## 2024-05-23 LAB
RAPID RVP RESULT: DETECTED
RV+EV RNA SPEC QL NAA+PROBE: DETECTED
SARS-COV-2 RNA PNL RESP NAA+PROBE: NOT DETECTED

## 2024-05-29 ENCOUNTER — APPOINTMENT (OUTPATIENT)
Dept: PSYCHIATRY | Facility: CLINIC | Age: 29
End: 2024-05-29
Payer: MEDICAID

## 2024-05-29 PROCEDURE — 90791 PSYCH DIAGNOSTIC EVALUATION: CPT | Mod: 95

## 2024-06-10 ENCOUNTER — APPOINTMENT (OUTPATIENT)
Dept: PSYCHIATRY | Facility: CLINIC | Age: 29
End: 2024-06-10
Payer: MEDICAID

## 2024-06-10 PROCEDURE — 90832 PSYTX W PT 30 MINUTES: CPT | Mod: 95

## 2024-06-17 ENCOUNTER — APPOINTMENT (OUTPATIENT)
Dept: PSYCHIATRY | Facility: CLINIC | Age: 29
End: 2024-06-17

## 2024-06-17 PROCEDURE — 90832 PSYTX W PT 30 MINUTES: CPT | Mod: 95

## 2024-06-25 ENCOUNTER — APPOINTMENT (OUTPATIENT)
Dept: PSYCHIATRY | Facility: CLINIC | Age: 29
End: 2024-06-25

## 2024-06-25 PROCEDURE — 90837 PSYTX W PT 60 MINUTES: CPT | Mod: 95

## 2024-07-02 ENCOUNTER — APPOINTMENT (OUTPATIENT)
Dept: PSYCHIATRY | Facility: CLINIC | Age: 29
End: 2024-07-02

## 2024-07-02 PROCEDURE — 90834 PSYTX W PT 45 MINUTES: CPT | Mod: 95

## 2024-07-15 ENCOUNTER — APPOINTMENT (OUTPATIENT)
Dept: PSYCHIATRY | Facility: CLINIC | Age: 29
End: 2024-07-15

## 2024-07-15 PROCEDURE — 90837 PSYTX W PT 60 MINUTES: CPT | Mod: 95

## 2024-07-22 ENCOUNTER — APPOINTMENT (OUTPATIENT)
Dept: PSYCHIATRY | Facility: CLINIC | Age: 29
End: 2024-07-22

## 2024-07-22 PROCEDURE — 90837 PSYTX W PT 60 MINUTES: CPT | Mod: 95

## 2024-07-22 NOTE — REASON FOR VISIT
[Patient preference] : as per patient preference [Telehealth (audio & video) - Individual/Group] : This visit was provided via telehealth using real-time 2-way audio visual technology. [Other Location: e.g. Home (Enter Location, City,State)___] : The provider was located at [unfilled]. [Home] : The patient, [unfilled], was located at home, [unfilled], at the time of the visit. [Verbal consent obtained from patient/other participant(s)] : Verbal consent for telehealth/telephonic services obtained from patient/other participant(s) [FreeTextEntry4] : 6PM [FreeTextEntry5] : 6:55PM [Patient] : Patient [FreeTextEntry1] : Patient would like to develop strategies for improving communication

## 2024-07-22 NOTE — END OF VISIT
[Duration of Psychotherapy Visit (minutes spent in synchronous communication): ____] : Duration of Psychotherapy Visit (minutes spent in synchronous communication): [unfilled] [Individual Psychotherapy for 53+ minutes] : Individual Psychotherapy for 53+ minutes  [Teletherapy Service Provided] : The services provided in this session were delivered via tele-therapy [FreeTextEntry3] : Home [FreeTextEntry5] : Home office [Licensed Clinician] : Licensed Clinician

## 2024-07-22 NOTE — PLAN
[Integrative Therapy] : Integrative Therapy  [de-identified] : Patient discussed frustration that his introversion gets in his way. He was receptive to intervention that focused on strengths of introversion. Patient discussed feeling alone in his social and communication difficulties. Patient was provided psychoeducation on this. Patient discussed being driven by emotion and his belief that it will interfere with him being a leader. He was receptive to ways that emotions benefit him and wants to discuss how to incorporate logic when he becomes emotionally overwhelmed. Patient identified anger and fear as the feelings that interfere most.  [Recommended Frequency of Visits: ____] : Recommended frequency of visits: [unfilled] [Return in ____ week(s)] : Return in [unfilled] week(s)

## 2024-07-29 ENCOUNTER — APPOINTMENT (OUTPATIENT)
Dept: PSYCHIATRY | Facility: CLINIC | Age: 29
End: 2024-07-29
Payer: MEDICAID

## 2024-07-29 PROCEDURE — 90837 PSYTX W PT 60 MINUTES: CPT | Mod: 95

## 2024-07-30 ENCOUNTER — APPOINTMENT (OUTPATIENT)
Dept: PULMONOLOGY | Facility: CLINIC | Age: 29
End: 2024-07-30
Payer: MEDICAID

## 2024-07-30 VITALS
HEIGHT: 73 IN | SYSTOLIC BLOOD PRESSURE: 126 MMHG | HEART RATE: 81 BPM | TEMPERATURE: 97.5 F | OXYGEN SATURATION: 97 % | DIASTOLIC BLOOD PRESSURE: 64 MMHG | RESPIRATION RATE: 16 BRPM | BODY MASS INDEX: 27.83 KG/M2 | WEIGHT: 210 LBS

## 2024-07-30 DIAGNOSIS — R06.02 SHORTNESS OF BREATH: ICD-10-CM

## 2024-07-30 DIAGNOSIS — T78.40XA ALLERGY, UNSPECIFIED, INITIAL ENCOUNTER: ICD-10-CM

## 2024-07-30 DIAGNOSIS — J45.909 UNSPECIFIED ASTHMA, UNCOMPLICATED: ICD-10-CM

## 2024-07-30 DIAGNOSIS — E66.3 OVERWEIGHT: ICD-10-CM

## 2024-07-30 DIAGNOSIS — G43.701 CHRONIC MIGRAINE W/OUT AURA, NOT INTRACTABLE, WITH STATUS MIGRAINOSUS: ICD-10-CM

## 2024-07-30 DIAGNOSIS — J82.83 EOSINOPHILIC ASTHMA: ICD-10-CM

## 2024-07-30 DIAGNOSIS — U07.1 COVID-19: ICD-10-CM

## 2024-07-30 DIAGNOSIS — J30.2 OTHER SEASONAL ALLERGIC RHINITIS: ICD-10-CM

## 2024-07-30 DIAGNOSIS — M70.60 TROCHANTERIC BURSITIS, UNSPECIFIED HIP: ICD-10-CM

## 2024-07-30 DIAGNOSIS — Z92.29 PERSONAL HISTORY OF OTHER DRUG THERAPY: ICD-10-CM

## 2024-07-30 DIAGNOSIS — R06.83 SNORING: ICD-10-CM

## 2024-07-30 DIAGNOSIS — R76.8 OTHER SPECIFIED ABNORMAL IMMUNOLOGICAL FINDINGS IN SERUM: ICD-10-CM

## 2024-07-30 PROCEDURE — 95012 NITRIC OXIDE EXP GAS DETER: CPT

## 2024-07-30 PROCEDURE — 94729 DIFFUSING CAPACITY: CPT

## 2024-07-30 PROCEDURE — 94727 GAS DIL/WSHOT DETER LNG VOL: CPT

## 2024-07-30 PROCEDURE — 99214 OFFICE O/P EST MOD 30 MIN: CPT | Mod: 25

## 2024-07-30 PROCEDURE — 94010 BREATHING CAPACITY TEST: CPT

## 2024-07-30 NOTE — PHYSICAL EXAM
[No Acute Distress] : no acute distress [Normal Oropharynx] : normal oropharynx [II] : Mallampati Class: II [Normal Appearance] : normal appearance [No Neck Mass] : no neck mass [Normal Rate/Rhythm] : normal rate/rhythm [Normal S1, S2] : normal s1, s2 [No Murmurs] : no murmurs [No Resp Distress] : no resp distress [Clear to Auscultation Bilaterally] : clear to auscultation bilaterally [No Abnormalities] : no abnormalities [Kyphosis] : kyphosis [Benign] : benign [Normal Gait] : normal gait [No Clubbing] : no clubbing [No Cyanosis] : no cyanosis [No Edema] : no edema [FROM] : FROM [Normal Color/ Pigmentation] : normal color/ pigmentation [No Focal Deficits] : no focal deficits [Oriented x3] : oriented x3 [Normal Affect] : normal affect [TextBox_68] : I:E 1:3, clear

## 2024-07-30 NOTE — PROCEDURE
[FreeTextEntry1] :  Full PFT reveals normal flows; FEV1 was 4.62 L which is 92 % of predicted, normal lung volumes, normal diffusions, at 35.3 L which is 102% predicted, abnormal flow volume loop. PFT's for performed to evaluate for SOB.     FENO was 17 ; a normal value being less than 25. Fractional exhaled nitric oxide (FENO) is regarded as a simple, noninvasive method for assessing eosinophilic airway inflammation. Produced by a variety of cells within the lung, nitric oxide (NO) concentrations are generally low in healthy individuals. However, high concentrations of NO appear to be involved in nonspecific host defense mechanisms and chronic inflammatory diseases such as asthma. The American Thoracic Society (ATS) therefore has strongly recommended using FENO to aid in the assessment, management, and long-term monitoring of eosinophilic airway inflammation and asthma, and for identifying steroid responsive individuals whose chronic respiratory symptoms may be caused by airway inflammation. In their 2011 clinical practice guideline, the ATS emphasizes the importance of using FENO.

## 2024-07-30 NOTE — ADDENDUM
[FreeTextEntry1] :  Documented by Sarbjit Waller acting as a scribe for Dr. Shawn Alves on 07/30/2024 .   All medical record entries made by the Scribe were at my, Dr. Shawn Alves's direction and personally dictated by me on 07/30/2024 . I have reviewed the chart and agree that the record accurately reflects my personal performance of the history, Physical exam, assessment, and plan. I have also personally directed, reviewed, and agree with the discharge instructions.

## 2024-07-30 NOTE — REASON FOR VISIT
[Patient preference] : as per patient preference [Telehealth (audio & video) - Individual/Group] : This visit was provided via telehealth using real-time 2-way audio visual technology. [Home] : The patient, [unfilled], was located at home, [unfilled], at the time of the visit. [Verbal consent obtained from patient/other participant(s)] : Verbal consent for telehealth/telephonic services obtained from patient/other participant(s) [FreeTextEntry4] : 6PM [FreeTextEntry5] : 6:55PM [Patient] : Patient [FreeTextEntry1] : Patient is seeking strategies to improve communication

## 2024-07-30 NOTE — HISTORY OF PRESENT ILLNESS
[TextBox_4] : Mr. ESCAMILLA is a 28 year old male presenting to the office today for follow up pulmonary evaluation. His chief complaint is  - he is now seeing a psychiatrist and he was on Effexor. 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    he notes feeling well  -he notes his energy levels are good -he notes not being too consistent with exercise  he notes vision is stable -he notes his trochanteric bursitis is still active he notes sleep is not good at less then 8 hours of sleep -he notes witnessed snoring -he notes his migraines are resolved -he notes only uses his rescue inhalers as needed    -Patient denies any headaches, nausea, vomiting, fever, chills, sweats, chest pain, chest pressure, palpitations, coughing, wheezing, fatigue, diarrhea, constipation, dysphagia, arthralgias, myalgias, dizziness, leg swelling, leg pain, itchy eyes, itchy ears, dysphonia, heartburn, reflux or sour taste in mouth.

## 2024-07-30 NOTE — PLAN
[Integrative Therapy] : Integrative Therapy  [de-identified] : Patient discussed having a difficult weekend where he was in his head a lot. Patient discussed some of his thoughts including reflection on those he's lost and his future. Patient discussed loneliness. Patient was receptive to communication strategies to connect with others. Patient stated interactions trigger anxiety which makes it difficult to communicate. [Recommended Frequency of Visits: ____] : Recommended frequency of visits: [unfilled] [Return in ____ week(s)] : Return in [unfilled] week(s)

## 2024-07-30 NOTE — ASSESSMENT
[FreeTextEntry1] : Mr. ESCAMILLA  is a 28 year old male with a history of OCD, depression, asthma, allergies, urticaria, s/p COVID-19 6/2022 who now comes to the office for a follow up pulmonary evaluation for SOB, mild and intermittent asthma, allergies, urticaria, elevated IgE/eosinophils- s/p active asthma (3/2023) s/p URI (resolved) - but steroid psychosis (resolved)-# 1 issue is "time"  His shortness of breath is multifactorial due to: -poor mechanics of breathing  -out of shape / over weight  - pulmonary disease    - mild and intermittent asthma  -?cardiac disease (doubt)  problem 1: Mild and intermittent asthma (Active)  -(Unable) Symbicort 160 2 inhalations BID  -(Unable) Nebulizer Albuterol/ Budesonide  0.5 mg BID   - Add Zyflo 120 BID  - Add Stiolto 2 puffs QD  -Ventolin 2 puffs Q6H, pre-exercise  - s/p blood work: alpha-1 antitrypsin level WNL  - Asthma is believed to be caused by inherited (genetic) and environmental factor, but its exact cause is unknown. Asthma may be triggered by allergens, lung infections, or irritants in the air. Asthma triggers are different for each person. -Inhaler technique reviewed as well as oral hygiene techniques reviewed with patient. Avoidance of cold air, extremes of temperature, rescue inhaler should be used before exercise. Order of medication reviewed with patient. Recommended use of a cool mist humidifier in the bedroom.  problem 1A: Elevated Ige - Xolair Candidate  -Xolair is a recombinant DNA- derived humanized IgG1K monoclonal antibody that selectively binds ot human immunoglobulin E (IgE). Xolair is produced by a Chinese hamster ovary cell suspension culture in nutrient medium containing the antibiotic gentamicin. Gentamicin is not detectable in the final product. Xolair is a sterile, white, preservative free, lyophilized powder contained in a single use vial that is reconstituted with sterile water for suspension. Side effects include: wheezing, tightness of the chest, trouble breathing, hives, skin rash, feeling anxious or light-headed, fainting, warmth or tingling under skin, or swelling of face, lips, or tongue   problem 1B: Eosinophilic asthma - Candidate for Nucala / Fasenra / Dupixent / Tezspire - The safety and efficacy of Nucala was established in three double-blind, randomized, placebo-controlled trials in patients with severe asthma. Compared to a placebo, patients with severe asthma receiving Nucala had fewer exacerbation requiring hospitalization and/or emergency department visits, and a longer time to first exacerbation. In addition, patients with severe asthma receiving Nucala or Fasenra experienced greater reductions in their daily maintenance oral corticosteroid dose, while maintaining asthma control compared with patients receiving placebo. Treatment with Nucala did not result in a significant improvement in lung function, as measured by the volume of air exhaled by patients in one second. The most common side effects include: headache, injection site reactions, back pain, weakness, and fatigue; hypersensitivity reactions can occur within hours or days including swelling of the face, mouth, and tongue, fainting, dizziness, hives, breathing problems, and rash; herpes zoster infections have occurred. The drug is a monoclonal antibody that inhibits interleukin-5 which helps regular eosinophils, a type of white blood cell that contributes to asthma. The over-production of eosinophils can cause inflammation in the lungs, increasing the frequency of asthma attacks. Patients must also take other medications, including high dose inhaled corticosteroids and at least one additional asthma drug.  Dupixent is a prescription medicine used with other asthma medicines for the maintenance treatment of moderate-to-severe asthma in people aged 12 years and older whose asthma is not controlled with their current asthma medicines. Dupixent helps prevent severe asthma attacks (exacerbations) and can improve your breathing. Dupixent may also help reduce the amount of oral corticosteroids you need while preventing severe asthma attacks and improving your breathing. Dupixent is not used to treat sudden breathing problems. Risks and side effect of Dupixent were discussed and reviewed with patient.   problem 2: Allergies / Urticaria  -add Olopatadine 0.6% 1 sniff BID  -continue Xyzal 5 mg QHS  - gets allergy shots  -s/p  script given for blood work:+ asthma profile, + food IgE panel, + eosinophil level,+ IgE level, Vitamin D level normal  - Environmental measures for allergies were encouraged including mattress and pillow cover, air purifier, and environmental controls.   Problem 3: ?OSAS (risk factors: snoring, neck size 16 inches, high mallampati class)  - s/p HSS Not present  -Sleep apnea is associated with adverse clinical consequences which can affect most organ systems.  Cardiovascular disease risk includes arrhythmias, atrial fibrillation, hypertension, coronary artery disease, and stroke. Metabolic disorders include diabetes type 2, non-alcoholic fatty liver disease. Mood disorder especially depression; and cognitive decline especially in the elderly. Associations with  chronic reflux/Bennett's esophagus some but not all inclusive.  -Reasons  include arousal consistent with hypopnea; respiratory events most prominent in REM sleep or supine position; therefore sleep staging and body position are important for accurate diagnosis and estimation of AHI.   problem 4 : cardiac disease (doubtful)  -recommended to continue to follow up with Cardiologist if needed  problem 5: poor breathing mechanics -recommended Wifrances Hof and [place] breathing techniques  -Proper breathing techniques were reviewed with an emphasis of exhalation. Patient instructed to breath in for 1 second and out for four seconds. Patient was encouraged to not talk while walking.   problem 6 :  overweight/ out of shape  - recommended 150 min of exercise a week  - Recommended "10-Day Detox" by Joe Linn for 2-3 weeks followed by probiotics  -Weight loss, exercise, and diet control were discussed and are highly encouraged. Treatment options are given such as, aqua therapy, and contacting a nutritionist. Recommended to use the elliptical, stationary bike, less use of treadmill.   Problem 7: s/p COVID-19 6/2022  - s/p COVID-19 vaccine x3    problem 8: health maintenance  -recommended yearly flu shot after October 15 2023 -recommended strep pneumonia vaccines: Prevnar-13 vaccine, followed by Pneumo vaccine 23 one year following after 65 years old.  -recommended early intervention for Upper Respiratory Infections (URIs) -recommended regular osteoporosis evaluations -recommended early dermatological evaluations -recommended after the age of 50 to the age of 70, colonoscopy every 5 years  F/U in 3-6 months . He is encouraged to call with any changes, concerns, or questions

## 2024-08-05 ENCOUNTER — APPOINTMENT (OUTPATIENT)
Dept: PSYCHIATRY | Facility: CLINIC | Age: 29
End: 2024-08-05

## 2024-08-05 PROCEDURE — 90837 PSYTX W PT 60 MINUTES: CPT | Mod: 95

## 2024-08-06 NOTE — PLAN
[Integrative Therapy] : Integrative Therapy  [de-identified] : Patient discussed different interactions at work that he believes went poorly. Patient believes his co-workers have a negative impression of him. Patient was receptive to alternative perspectives on these interactions. Patient discussed professional goals. He was receptive to taking his career one step at a time. [Recommended Frequency of Visits: ____] : Recommended frequency of visits: [unfilled] [Return in ____ week(s)] : Return in [unfilled] week(s)

## 2024-08-06 NOTE — REASON FOR VISIT
[Patient preference] : as per patient preference [Telehealth (audio & video) - Individual/Group] : This visit was provided via telehealth using real-time 2-way audio visual technology. [Other Location: e.g. Home (Enter Location, City,State)___] : The provider was located at [unfilled]. [Home] : The patient, [unfilled], was located at home, [unfilled], at the time of the visit. [Verbal consent obtained from patient/other participant(s)] : Verbal consent for telehealth/telephonic services obtained from patient/other participant(s) [FreeTextEntry4] : 6Pm [FreeTextEntry5] : 6:55PM [Patient] : Patient [FreeTextEntry1] : Patient is seeking treatment to develop communication skills

## 2024-08-12 ENCOUNTER — APPOINTMENT (OUTPATIENT)
Dept: PSYCHIATRY | Facility: CLINIC | Age: 29
End: 2024-08-12
Payer: MEDICAID

## 2024-08-12 PROCEDURE — 90837 PSYTX W PT 60 MINUTES: CPT | Mod: 95

## 2024-08-12 NOTE — END OF VISIT
[Duration of Psychotherapy Visit (minutes spent in synchronous communication): ____] : Duration of Psychotherapy Visit (minutes spent in synchronous communication): [unfilled] [Individual Psychotherapy for 53+ minutes] : Individual Psychotherapy for 53+ minutes  [FreeTextEntry3] : Home [FreeTextEntry5] : Home office [Licensed Clinician] : Licensed Clinician

## 2024-08-12 NOTE — PLAN
[Integrative Therapy] : Integrative Therapy  [de-identified] : Patient discussed this being his last week at work. Patient discussed imposter syndrome about pursuing leadership roles. Patient was receptive to feedback that experience will help with the confidence he is currently lacking. Session focused on patient's low self-esteem. Interventions highlighted his positive attributes. Times patient spoke poorly of himself were also highlighted. Session ended with a plan for small talk during his last week. Sessions will focus on patient's difficulty with being stuck at home. [Recommended Frequency of Visits: ____] : Recommended frequency of visits: [unfilled] [Return in ____ week(s)] : Return in [unfilled] week(s)

## 2024-08-12 NOTE — REASON FOR VISIT
[Patient preference] : as per patient preference [Telehealth (audio & video) - Individual/Group] : This visit was provided via telehealth using real-time 2-way audio visual technology. [Other Location: e.g. Home (Enter Location, City,State)___] : The provider was located at [unfilled]. [Home] : The patient, [unfilled], was located at home, [unfilled], at the time of the visit. [Verbal consent obtained from patient/other participant(s)] : Verbal consent for telehealth/telephonic services obtained from patient/other participant(s) [FreeTextEntry4] : 6PM [FreeTextEntry5] : 6:55PM [Patient] : Patient [FreeTextEntry1] : Patient is seeking treatment for communication strategies

## 2024-08-19 ENCOUNTER — APPOINTMENT (OUTPATIENT)
Dept: PSYCHIATRY | Facility: CLINIC | Age: 29
End: 2024-08-19
Payer: MEDICAID

## 2024-08-19 PROCEDURE — 90834 PSYTX W PT 45 MINUTES: CPT | Mod: 95

## 2024-08-19 NOTE — REASON FOR VISIT
[Patient preference] : as per patient preference [Telehealth (audio & video) - Individual/Group] : This visit was provided via telehealth using real-time 2-way audio visual technology. [Other Location: e.g. Home (Enter Location, City,State)___] : The provider was located at [unfilled]. [Home] : The patient, [unfilled], was located at home, [unfilled], at the time of the visit. [Verbal consent obtained from patient/other participant(s)] : Verbal consent for telehealth/telephonic services obtained from patient/other participant(s) [FreeTextEntry4] : 6PM [FreeTextEntry5] : 6:45PM [Patient] : Patient [FreeTextEntry1] : Patient is seeking treatment for communication and socialization strategies

## 2024-08-19 NOTE — PLAN
[Integrative Therapy] : Integrative Therapy  [de-identified] : Session focused on patient's anger. Patient discussed circumstances that impact his current anger and lack of identity. Intervention focused on whether the anger was helping patient achieve his goals. Patient will think of his anger this week and where it is keeping him from moving forward. [Recommended Frequency of Visits: ____] : Recommended frequency of visits: [unfilled] [Return in ____ week(s)] : Return in [unfilled] week(s)

## 2024-08-19 NOTE — END OF VISIT
[Duration of Psychotherapy Visit (minutes spent in synchronous communication): ____] : Duration of Psychotherapy Visit (minutes spent in synchronous communication): [unfilled] [Individual Psychotherapy for 38-52 minutes] : Individual Psychotherapy for 38 - 52 minutes [FreeTextEntry3] : Home [FreeTextEntry5] : Home office [Licensed Clinician] : Licensed Clinician

## 2024-08-26 ENCOUNTER — APPOINTMENT (OUTPATIENT)
Dept: PSYCHIATRY | Facility: CLINIC | Age: 29
End: 2024-08-26
Payer: MEDICAID

## 2024-08-26 PROCEDURE — 90837 PSYTX W PT 60 MINUTES: CPT | Mod: 95

## 2024-08-26 NOTE — REASON FOR VISIT
[Patient preference] : as per patient preference [Telehealth (audio & video) - Individual/Group] : This visit was provided via telehealth using real-time 2-way audio visual technology. [Other Location: e.g. Home (Enter Location, City,State)___] : The provider was located at [unfilled]. [Home] : The patient, [unfilled], was located at home, [unfilled], at the time of the visit. [Verbal consent obtained from patient/other participant(s)] : Verbal consent for telehealth/telephonic services obtained from patient/other participant(s) [FreeTextEntry4] : 4PM [FreeTextEntry5] : 4:55PM [Patient] : Patient [FreeTextEntry1] : Patient would like to develop strategies for communication for professional and social purposes

## 2024-08-26 NOTE — PLAN
[Integrative Therapy] : Integrative Therapy  [de-identified] : Patient discussed loneliness. Patient was receptive to strategies for socialization and that it is possible to focus on both career and socialization. Patient expressed frustration that he is unsure of his identity. Patient identified enjoying nature, being a free spirit, and enjoying thrill rides as parts of his identity. Next session will focus on job interview preparation.  [Recommended Frequency of Visits: ____] : Recommended frequency of visits: [unfilled] [Return in ____ week(s)] : Return in [unfilled] week(s)

## 2024-09-03 ENCOUNTER — APPOINTMENT (OUTPATIENT)
Dept: PSYCHIATRY | Facility: CLINIC | Age: 29
End: 2024-09-03

## 2024-09-03 PROCEDURE — 90834 PSYTX W PT 45 MINUTES: CPT | Mod: 95

## 2024-09-03 NOTE — REASON FOR VISIT
[Patient preference] : as per patient preference [Telehealth (audio & video) - Individual/Group] : This visit was provided via telehealth using real-time 2-way audio visual technology. [Other Location: e.g. Home (Enter Location, City,State)___] : The provider was located at [unfilled]. [Home] : The patient, [unfilled], was located at home, [unfilled], at the time of the visit. [FreeTextEntry4] : 5PM [FreeTextEntry5] : 5:50PM [Patient] : Patient [FreeTextEntry1] : Patient is seeking treatment to develop independence and live on his own

## 2024-09-03 NOTE — PLAN
[Integrative Therapy] : Integrative Therapy  [de-identified] : Patient discussed desire for independence. Patient current feels co-dependent on his mother and would like to make strides to eventually live on his own. Patient created a list of tasks to increase independence. These will be focus of treatment. [Recommended Frequency of Visits: ____] : Recommended frequency of visits: [unfilled] [Return in ____ week(s)] : Return in [unfilled] week(s)

## 2024-09-03 NOTE — END OF VISIT
[Duration of Psychotherapy Visit (minutes spent in synchronous communication): ____] : Duration of Psychotherapy Visit (minutes spent in synchronous communication): [unfilled] [Individual Psychotherapy for 38-52 minutes] : Individual Psychotherapy for 38 - 52 minutes [Teletherapy Service Provided] : The services provided in this session were delivered via tele-therapy [FreeTextEntry3] : Home [FreeTextEntry5] : Home office [Licensed Clinician] : Licensed Clinician

## 2024-09-09 ENCOUNTER — APPOINTMENT (OUTPATIENT)
Dept: PSYCHIATRY | Facility: CLINIC | Age: 29
End: 2024-09-09
Payer: SELF-PAY

## 2024-09-09 PROCEDURE — 90834 PSYTX W PT 45 MINUTES: CPT | Mod: 95

## 2024-09-09 NOTE — REASON FOR VISIT
[Patient preference] : as per patient preference [Telehealth (audio & video) - Individual/Group] : This visit was provided via telehealth using real-time 2-way audio visual technology. [Other Location: e.g. Home (Enter Location, City,State)___] : The provider was located at [unfilled]. [Home] : The patient, [unfilled], was located at home, [unfilled], at the time of the visit. [Patient's space is appropriate for telehealth and maintains privacy/confidentiality.] : Patient's space is appropriate for telehealth and maintains privacy/confidentiality. [Participant(s) identity verified] : Participant(s) identity verified. [FreeTextEntry4] : 1PM [FreeTextEntry5] : 1:50PM [Patient] : Patient [FreeTextEntry1] : Patient is seeking support for communication and career advancement.

## 2024-09-09 NOTE — PLAN
[Integrative Therapy] : Integrative Therapy  [de-identified] : Patient spoke about his relationship with his father. Patient detailed anger and jealousy towards his father. Intervention focused on jealousy which is rooted in father's professional role. Patient was receptive and eager to continue conversation at next session. 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 Patient was seeking guidance on what to discuss at his follow-up meeting with his supervisor. Patient was receptive to focusing on next steps for his career. [Recommended Frequency of Visits: ____] : Recommended frequency of visits: [unfilled] [Return in ____ week(s)] : Return in [unfilled] week(s)

## 2024-09-24 ENCOUNTER — APPOINTMENT (OUTPATIENT)
Dept: PSYCHIATRY | Facility: CLINIC | Age: 29
End: 2024-09-24
Payer: MEDICAID

## 2024-09-24 PROCEDURE — 90834 PSYTX W PT 45 MINUTES: CPT | Mod: 95

## 2024-10-01 ENCOUNTER — APPOINTMENT (OUTPATIENT)
Dept: PSYCHIATRY | Facility: CLINIC | Age: 29
End: 2024-10-01
Payer: MEDICAID

## 2024-10-01 PROCEDURE — 90837 PSYTX W PT 60 MINUTES: CPT | Mod: 95

## 2024-10-15 ENCOUNTER — APPOINTMENT (OUTPATIENT)
Dept: PSYCHIATRY | Facility: CLINIC | Age: 29
End: 2024-10-15
Payer: SELF-PAY

## 2024-10-15 PROCEDURE — 90837 PSYTX W PT 60 MINUTES: CPT | Mod: 95

## 2024-10-22 ENCOUNTER — APPOINTMENT (OUTPATIENT)
Dept: PSYCHIATRY | Facility: CLINIC | Age: 29
End: 2024-10-22

## 2024-10-30 ENCOUNTER — APPOINTMENT (OUTPATIENT)
Dept: PSYCHIATRY | Facility: CLINIC | Age: 29
End: 2024-10-30
Payer: MEDICAID

## 2024-10-30 PROCEDURE — 90837 PSYTX W PT 60 MINUTES: CPT | Mod: 95

## 2024-11-12 ENCOUNTER — APPOINTMENT (OUTPATIENT)
Dept: PSYCHIATRY | Facility: CLINIC | Age: 29
End: 2024-11-12
Payer: SELF-PAY

## 2024-11-12 PROCEDURE — 90837 PSYTX W PT 60 MINUTES: CPT | Mod: 95

## 2024-11-26 ENCOUNTER — APPOINTMENT (OUTPATIENT)
Dept: PSYCHIATRY | Facility: CLINIC | Age: 29
End: 2024-11-26
Payer: COMMERCIAL

## 2024-11-26 PROCEDURE — 90837 PSYTX W PT 60 MINUTES: CPT | Mod: 95

## 2024-12-09 ENCOUNTER — APPOINTMENT (OUTPATIENT)
Dept: PSYCHIATRY | Facility: CLINIC | Age: 29
End: 2024-12-09
Payer: SELF-PAY

## 2024-12-09 PROCEDURE — 90837 PSYTX W PT 60 MINUTES: CPT | Mod: 95

## 2024-12-17 ENCOUNTER — APPOINTMENT (OUTPATIENT)
Dept: PSYCHIATRY | Facility: CLINIC | Age: 29
End: 2024-12-17
Payer: COMMERCIAL

## 2024-12-17 PROCEDURE — 90837 PSYTX W PT 60 MINUTES: CPT | Mod: 95

## 2025-01-06 ENCOUNTER — APPOINTMENT (OUTPATIENT)
Dept: PSYCHIATRY | Facility: CLINIC | Age: 30
End: 2025-01-06
Payer: COMMERCIAL

## 2025-01-06 PROCEDURE — 90837 PSYTX W PT 60 MINUTES: CPT | Mod: 95

## 2025-01-10 ENCOUNTER — EMERGENCY (EMERGENCY)
Facility: HOSPITAL | Age: 30
LOS: 1 days | Discharge: HOME | End: 2025-01-10
Attending: STUDENT IN AN ORGANIZED HEALTH CARE EDUCATION/TRAINING PROGRAM | Admitting: STUDENT IN AN ORGANIZED HEALTH CARE EDUCATION/TRAINING PROGRAM
Payer: COMMERCIAL

## 2025-01-10 VITALS
RESPIRATION RATE: 20 BRPM | SYSTOLIC BLOOD PRESSURE: 124 MMHG | DIASTOLIC BLOOD PRESSURE: 85 MMHG | OXYGEN SATURATION: 98 % | TEMPERATURE: 98 F | HEART RATE: 66 BPM

## 2025-01-10 VITALS
HEART RATE: 71 BPM | SYSTOLIC BLOOD PRESSURE: 114 MMHG | HEIGHT: 73 IN | RESPIRATION RATE: 17 BRPM | TEMPERATURE: 98 F | WEIGHT: 216.93 LBS | DIASTOLIC BLOOD PRESSURE: 73 MMHG | OXYGEN SATURATION: 97 %

## 2025-01-10 LAB
ALBUMIN SERPL ELPH-MCNC: 3.5 G/DL — SIGNIFICANT CHANGE UP (ref 3.3–5)
ALP SERPL-CCNC: 43 U/L — SIGNIFICANT CHANGE UP (ref 30–120)
ALT FLD-CCNC: 22 U/L — SIGNIFICANT CHANGE UP (ref 10–60)
ANION GAP SERPL CALC-SCNC: 8 MMOL/L — SIGNIFICANT CHANGE UP (ref 5–17)
APPEARANCE UR: CLEAR — SIGNIFICANT CHANGE UP
AST SERPL-CCNC: 13 U/L — SIGNIFICANT CHANGE UP (ref 10–40)
BASOPHILS # BLD AUTO: 0.04 K/UL — SIGNIFICANT CHANGE UP (ref 0–0.2)
BASOPHILS NFR BLD AUTO: 0.5 % — SIGNIFICANT CHANGE UP (ref 0–2)
BILIRUB SERPL-MCNC: 0.2 MG/DL — SIGNIFICANT CHANGE UP (ref 0.2–1.2)
BILIRUB UR-MCNC: NEGATIVE — SIGNIFICANT CHANGE UP
BLD GP AB SCN SERPL QL: SIGNIFICANT CHANGE UP
BUN SERPL-MCNC: 20 MG/DL — SIGNIFICANT CHANGE UP (ref 7–23)
CALCIUM SERPL-MCNC: 9.1 MG/DL — SIGNIFICANT CHANGE UP (ref 8.4–10.5)
CHLORIDE SERPL-SCNC: 105 MMOL/L — SIGNIFICANT CHANGE UP (ref 96–108)
CO2 SERPL-SCNC: 28 MMOL/L — SIGNIFICANT CHANGE UP (ref 22–31)
COLOR SPEC: YELLOW — SIGNIFICANT CHANGE UP
CREAT SERPL-MCNC: 1.15 MG/DL — SIGNIFICANT CHANGE UP (ref 0.5–1.3)
DIFF PNL FLD: NEGATIVE — SIGNIFICANT CHANGE UP
EGFR: 89 ML/MIN/1.73M2 — SIGNIFICANT CHANGE UP
EOSINOPHIL # BLD AUTO: 0.48 K/UL — SIGNIFICANT CHANGE UP (ref 0–0.5)
EOSINOPHIL NFR BLD AUTO: 5.7 % — SIGNIFICANT CHANGE UP (ref 0–6)
GLUCOSE SERPL-MCNC: 99 MG/DL — SIGNIFICANT CHANGE UP (ref 70–99)
GLUCOSE UR QL: NEGATIVE MG/DL — SIGNIFICANT CHANGE UP
HCT VFR BLD CALC: 44.2 % — SIGNIFICANT CHANGE UP (ref 39–50)
HGB BLD-MCNC: 15 G/DL — SIGNIFICANT CHANGE UP (ref 13–17)
IMM GRANULOCYTES NFR BLD AUTO: 0.1 % — SIGNIFICANT CHANGE UP (ref 0–0.9)
KETONES UR-MCNC: ABNORMAL MG/DL
LEUKOCYTE ESTERASE UR-ACNC: NEGATIVE — SIGNIFICANT CHANGE UP
LITHIUM SERPL-MCNC: 0.64 MMOL/L — SIGNIFICANT CHANGE UP (ref 0.6–1.2)
LYMPHOCYTES # BLD AUTO: 3.09 K/UL — SIGNIFICANT CHANGE UP (ref 1–3.3)
LYMPHOCYTES # BLD AUTO: 36.7 % — SIGNIFICANT CHANGE UP (ref 13–44)
MAGNESIUM SERPL-MCNC: 1.7 MG/DL — SIGNIFICANT CHANGE UP (ref 1.6–2.6)
MCHC RBC-ENTMCNC: 31.4 PG — SIGNIFICANT CHANGE UP (ref 27–34)
MCHC RBC-ENTMCNC: 33.9 G/DL — SIGNIFICANT CHANGE UP (ref 32–36)
MCV RBC AUTO: 92.7 FL — SIGNIFICANT CHANGE UP (ref 80–100)
MONOCYTES # BLD AUTO: 0.59 K/UL — SIGNIFICANT CHANGE UP (ref 0–0.9)
MONOCYTES NFR BLD AUTO: 7 % — SIGNIFICANT CHANGE UP (ref 2–14)
NEUTROPHILS # BLD AUTO: 4.2 K/UL — SIGNIFICANT CHANGE UP (ref 1.8–7.4)
NEUTROPHILS NFR BLD AUTO: 50 % — SIGNIFICANT CHANGE UP (ref 43–77)
NITRITE UR-MCNC: NEGATIVE — SIGNIFICANT CHANGE UP
NRBC # BLD: 0 /100 WBCS — SIGNIFICANT CHANGE UP (ref 0–0)
PH UR: 7 — SIGNIFICANT CHANGE UP (ref 5–8)
PLATELET # BLD AUTO: 230 K/UL — SIGNIFICANT CHANGE UP (ref 150–400)
POTASSIUM SERPL-MCNC: 4.3 MMOL/L — SIGNIFICANT CHANGE UP (ref 3.5–5.3)
POTASSIUM SERPL-SCNC: 4.3 MMOL/L — SIGNIFICANT CHANGE UP (ref 3.5–5.3)
PROT SERPL-MCNC: 6.3 G/DL — SIGNIFICANT CHANGE UP (ref 6–8.3)
PROT UR-MCNC: NEGATIVE MG/DL — SIGNIFICANT CHANGE UP
RBC # BLD: 4.77 M/UL — SIGNIFICANT CHANGE UP (ref 4.2–5.8)
RBC # FLD: 11.4 % — SIGNIFICANT CHANGE UP (ref 10.3–14.5)
SODIUM SERPL-SCNC: 141 MMOL/L — SIGNIFICANT CHANGE UP (ref 135–145)
SP GR SPEC: 1.02 — SIGNIFICANT CHANGE UP (ref 1–1.03)
UROBILINOGEN FLD QL: 0.2 MG/DL — SIGNIFICANT CHANGE UP (ref 0.2–1)
VALPROATE SERPL-MCNC: 65 UG/ML — SIGNIFICANT CHANGE UP (ref 50–100)
WBC # BLD: 8.41 K/UL — SIGNIFICANT CHANGE UP (ref 3.8–10.5)
WBC # FLD AUTO: 8.41 K/UL — SIGNIFICANT CHANGE UP (ref 3.8–10.5)

## 2025-01-10 PROCEDURE — 83735 ASSAY OF MAGNESIUM: CPT

## 2025-01-10 PROCEDURE — 86901 BLOOD TYPING SEROLOGIC RH(D): CPT

## 2025-01-10 PROCEDURE — 36415 COLL VENOUS BLD VENIPUNCTURE: CPT

## 2025-01-10 PROCEDURE — 80178 ASSAY OF LITHIUM: CPT

## 2025-01-10 PROCEDURE — 80053 COMPREHEN METABOLIC PANEL: CPT

## 2025-01-10 PROCEDURE — 85025 COMPLETE CBC W/AUTO DIFF WBC: CPT

## 2025-01-10 PROCEDURE — 99284 EMERGENCY DEPT VISIT MOD MDM: CPT

## 2025-01-10 PROCEDURE — 86900 BLOOD TYPING SEROLOGIC ABO: CPT

## 2025-01-10 PROCEDURE — 80164 ASSAY DIPROPYLACETIC ACD TOT: CPT

## 2025-01-10 PROCEDURE — 86850 RBC ANTIBODY SCREEN: CPT

## 2025-01-10 PROCEDURE — 81003 URINALYSIS AUTO W/O SCOPE: CPT

## 2025-01-10 PROCEDURE — 96360 HYDRATION IV INFUSION INIT: CPT

## 2025-01-10 PROCEDURE — 99283 EMERGENCY DEPT VISIT LOW MDM: CPT | Mod: 25

## 2025-01-10 RX ORDER — SODIUM CHLORIDE 9 MG/ML
1000 INJECTION, SOLUTION INTRAMUSCULAR; INTRAVENOUS; SUBCUTANEOUS ONCE
Refills: 0 | Status: COMPLETED | OUTPATIENT
Start: 2025-01-10 | End: 2025-01-10

## 2025-01-10 RX ADMIN — SODIUM CHLORIDE 1000 MILLILITER(S): 9 INJECTION, SOLUTION INTRAMUSCULAR; INTRAVENOUS; SUBCUTANEOUS at 21:02

## 2025-01-10 RX ADMIN — SODIUM CHLORIDE 1000 MILLILITER(S): 9 INJECTION, SOLUTION INTRAMUSCULAR; INTRAVENOUS; SUBCUTANEOUS at 20:02

## 2025-01-10 NOTE — ED PROVIDER NOTE - DIFFERENTIAL DIAGNOSIS
[FreeTextEntry1] : The patient is a pleasant 74 year old female diagnosed with left breast cancer..  She noted a palpable left breast nodule at the lateral edge of the breast at 4 oclock in March 2024. Saw her GYN and was was sent for mammo/sono which was negative in March 2024.   Her previous bilateral mammogram ([facility]) 10/14 and 10/27/2023: Extremely dense. Benign. (Breast arterial calcifications seen) Bilateral breast ultrasound same day: negative.  Left mammogram 3/22/2024: No evidence of malignancy. Left breast ultrasound 3/22/2024: No discrete mass.  She was concerned about the nodule and was referred to breast surgery. On 6/19/24 she was seen in the office of Dr. Ely, palpable nodule present, sonogram in the office showed 5 mm irregular mass corresponding to palpable concern.  Left breast biopsy 6/21/2024: Left 5:00 N8= infiltrating ductal carcinoma, SBR 5/9, ER 75% WI 30% Her 2 Negative..   MRI 6/26/24 showed : Ill-defined enhancing mass measuring approximately 1.4 cm in the far posterior outer slightly lower left breast with associated susceptibility artifact likely corresponding to biopsy-proven IDC. Enhancement abuts the chest wall with possible extension. 4.8 cm partially visualized hepatic lesion, not adequately evaluated in this study. Further evaluation by liver protocol CT or MR is recommended. MR liver 6/29/24 confirmed a liver bilobed cyst.  7/15/24 Left breast lumpectomy and SLN biopsy with Dr. Ely.  Pathology demonstrated and 11mm invasive Ductal carcinoma SBR 4-5/9  All margins negative for Invasive Carcinoma and > 5 mm. Left SLN negative. 0/1.  Oncotype DX score was 9.  [employer] showed VIOLETTE mutation - VUS. 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 She has celiac disease and is a vegetarian. She has taken Prolia for 4 years and her osteoporosis has greatly improved and she was planning to receive her last injection in November 2024 but this will be reavaluated in light of her breast cancer diagnosis and recommendation for endocrine therapy which can impact bone health. She is retired and very active, wakes up at 4am daily and stretches and walks 5 miles 6 days a week. Attends Shinto on Sundays. She has three children who live out of state. She has a 2 week vacation planned to the [place] leaving Sept 1, 2024. She  presents today with her  Wilmer to discuss the role of radiation therapy in her care. She opted for L breast APBI.  9/24/24 Patient is seen for OTV, fx 2/5. Feels very well. More relaxed. Using organic coconut oil on skin. G0 erythema of the L breast. Trying reflexology today. Ddx includes but not limited to dehydration, migraine, tension headache, electrolyte imbalance, abnormal Li/Depakote levels, toxicology Differential Diagnosis

## 2025-01-10 NOTE — ED ADULT NURSE NOTE - JUGULAR VENOUS DISTENTION
PROCEDURE: 

CT abdomen and pelvis with contrast.



TECHNIQUE: 

Multiple contiguous axial images were obtained through the

abdomen and pelvis after administration of intravenous contrast.

Auto Exposure Controls were utilized during the CT exam to meet

ALARA standards for radiation dose reduction. 



INDICATION:  

Left lower quadrant pain.



COMPARISON:    

Recent CT from 09/16/2020.



FINDINGS:

The lung bases are clear of acute infiltrates. The liver is

stable. The gallbladder is surgically absent. No biliary ductal

dilatation is seen. The pancreas is unremarkable. Post surgical

changes in the left upper quadrant are again noted. The small

subcapsular fluid collection along the spleen is stable. No

adrenal mass is identified. The renal cortical low density in the

upper pole of the left kidney is stable. The aorta is

nonaneurysmal. There continue to be findings of acute sigmoid

diverticulitis. A fluid collection along the left in the inferior

aspect of the inflamed left sigmoid loop is again noted measuring

6.6 x 5.0 cm compared with 6.8 x 5.5 cm on the prior exam. The

bladder is unremarkable. The uterus is again somewhat displaced

to the right. The bowel loops are nonobstructed. There is no free

fluid or free air. No new abnormality is seen.



IMPRESSION: 

The findings remain consistent with acute sigmoid diverticulitis.

There is no bowel obstruction. The cystic collection in the left

hemipelvis is similar to perhaps slightly smaller when compared

with the examination from 5 days earlier. Continued followup

could be performed.



Dictated by: 



  Dictated on workstation # [id number]
absent

## 2025-01-10 NOTE — ED PROVIDER NOTE - CARE PROVIDER_API CALL
Mehrdad Duenas  Internal Medicine  [address] [place] [id number]  Phone: [phone]  Fax: [phone]  Follow Up Time:    Mehrdad Duenas  Internal Medicine  [address] [place] [address]  Phone: [phone]  Fax: [phone]  Follow Up Time:     Jose Smith  Neurology  [address] [place] [address]  Phone: [phone]  Fax: [phone]  Follow Up Time:

## 2025-01-10 NOTE — ED PROVIDER NOTE - OBJECTIVE STATEMENT
29-year-old male with a history of anxiety, depression, bipolar disorder (on Lithium and Depakote) presents with headache.  Patient states that he had been drinking 8 -10 ounces of coffee daily for 3 to 4 days.  He ordinarily does not drink caffeine.  He has not had any coffee for 2 days but developed a headache yesterday that he associated with caffeine withdrawal.  He states that he has generalized headache, feels "out of it" and is having poor concentration.  He denies any fever, nausea, vomiting, blurry vision, neck pain, rash.  He has been treating his symptoms with Tylenol, last taken 2 hours ago.  Mom reach out to patient's primary care doctor who was concerned about patient's lithium level and suggested he come to the emergency room to have it checked.  Mom states the patient does have a history of migraines and has followed up with neuro as well as neuro-ophthalmology in the past.  Currently patient states that he does not have headache and is declining any medication. No alcohol or drug use.  PMD Dr. Duenas

## 2025-01-10 NOTE — ED PROVIDER NOTE - PROVIDER TOKENS
PROVIDER:[TOKEN:[[id number]]] PROVIDER:[TOKEN:[[id number]]],PROVIDER:[TOKEN:[[id number]]]

## 2025-01-10 NOTE — ED ADULT NURSE NOTE - OBJECTIVE STATEMENT
28 y/o male presents axo4 ambulatory c/o headaches and lightheadedness and "feels out of it" since yesterday, PMD Dr Duenas suspects lithium level change.

## 2025-01-10 NOTE — ED PROVIDER NOTE - CLINICAL SUMMARY MEDICAL DECISION MAKING FREE TEXT BOX
29-year-old male with a history of bipolar disorder presents with headache, decreased concentration x 2 days.  Patient had assumed it was the result of a caffeine withdrawal.  No associated fever, nausea, vomiting, blurry vision, neck pain, rash.  Currently patient denies headache.  Check labs, serum electroylte, UA, lithium and Depakote levels, hydrate, reassess.

## 2025-01-10 NOTE — ED PROVIDER NOTE - PATIENT PORTAL LINK FT
You can access the FollowMyHealth Patient Portal offered by [facility] by registering at the following website: [email or web address]. By joining [employer]’s FollowMyHealth portal, you will also be able to view your health information using other applications (apps) compatible with our system.

## 2025-01-10 NOTE — ED ADULT TRIAGE NOTE - CHIEF COMPLAINT QUOTE
30 y/o male presents axo4 ambulatory c/o headaches and lightheadedness and "feels out of it" since yesterday, PMD Dr Duenas suspects lithium level change.

## 2025-01-10 NOTE — ED PROVIDER NOTE - PROGRESS NOTE DETAILS
Results of workup discussed with patient and mom at the bedside, copies of all reports given.  Patient states he has a mild headache but is declining any analgesia.  He otherwise feels well.  He will follow-up with his primary care doctor.  Return precautions discussed.  Patient expressed understanding of discharge instructions.

## 2025-01-10 NOTE — ED PROVIDER NOTE - NSICDXPASTMEDICALHX_GEN_ALL_CORE_FT
PAST MEDICAL HISTORY:  Anxiety and depression     Bipolar disorder     SHRAVAN (obstructive sleep apnea)

## 2025-01-10 NOTE — ED PROVIDER NOTE - NSFOLLOWUPINSTRUCTIONS_ED_ALL_ED_FT
Please continue taking your regular medication. Follow up with your primary care doctor and neurology.  Return to the ER for persistent headache, fever, neck pain, rash, lethargy, blurry vision, weakness, dizziness, or any other concerns. Please continue taking your regular medication. Follow up with your primary care doctor and neurology.  Return to the ER for persistent headache, fever, neck pain, rash, lethargy, blurry vision, weakness, dizziness, or any other concerns.      Migraine Headache    A migraine headache is an intense, throbbing pain on one side or both sides of the head. Migraine headaches may also cause other symptoms, such as nausea, vomiting, and sensitivity to light and noise. A migraine headache can last from 4 hours to 3 days. Talk with your doctor about what things may bring on (trigger) your migraine headaches.    What are the causes?  The exact cause of this condition is not known. However, a migraine may be caused when nerves in the brain become irritated and release chemicals that cause inflammation of blood vessels. This inflammation causes pain. This condition may be triggered or caused by:    Drinking alcohol.  Smoking.  Taking medicines, such as:    Medicine used to treat chest pain (nitroglycerin).  Birth control pills.  Estrogen.  Certain blood pressure medicines.  Eating or drinking products that contain nitrates, glutamate, aspartame, or tyramine. Aged cheeses, chocolate, or caffeine may also be triggers.  Doing physical activity.    Other things that may trigger a migraine headache include:    Menstruation.  Pregnancy.  Hunger.  Stress.  Lack of sleep or too much sleep.  Weather changes.  Fatigue.    What increases the risk?  The following factors may make you more likely to experience migraine headaches:    Being a certain age. This condition is more common in people who are 25–55 years old.  Being female.  Having a family history of migraine headaches.  Being .  Having a mental health condition, such as depression or anxiety.  Being obese.    What are the signs or symptoms?  The main symptom of this condition is pulsating or throbbing pain. This pain may:    Happen in any area of the head, such as on one side or both sides.  Interfere with daily activities.  Get worse with physical activity.  Get worse with exposure to bright lights or loud noises.    Other symptoms may include:    Nausea.  Vomiting.  Dizziness.  General sensitivity to bright lights, loud noises, or smells.    Before you get a migraine headache, you may get warning signs (an aura). An aura may include:    Seeing flashing lights or having blind spots.  Seeing bright spots, halos, or zigzag lines.  Having tunnel vision or blurred vision.  Having numbness or a tingling feeling.  Having trouble talking.  Having muscle weakness.    Some people have symptoms after a migraine headache (postdromal phase), such as:    Feeling tired.  Difficulty concentrating.    How is this diagnosed?  A migraine headache can be diagnosed based on:    Your symptoms.  A physical exam.  Tests, such as:     CT scan or an MRI of the head. These imaging tests can help rule out other causes of headaches.  Taking fluid from the spine (lumbar puncture) and analyzing it (cerebrospinal fluid analysis, or CSF analysis).    How is this treated?  This condition may be treated with medicines that:    Relieve pain.  Relieve nausea.  Prevent migraine headaches.    Treatment for this condition may also include:    Acupuncture.  Lifestyle changes like avoiding foods that trigger migraine headaches.  Biofeedback.  Cognitive behavioral therapy.    Follow these instructions at home:      Medicines    Take over-the-counter and prescription medicines only as told by your health care provider.  Ask your health care provider if the medicine prescribed to you:    Requires you to avoid driving or using heavy machinery.  Can cause constipation. You may need to take these actions to prevent or treat constipation:    Drink enough fluid to keep your urine pale yellow.  Take over-the-counter or prescription medicines.  Eat foods that are high in fiber, such as beans, whole grains, and fresh fruits and vegetables.  Limit foods that are high in fat and processed sugars, such as fried or sweet foods.        Lifestyle    Do not drink alcohol.  Do not use any products that contain nicotine or tobacco, such as cigarettes, e-cigarettes, and chewing tobacco. If you need help quitting, ask your health care provider.  Get at least 8 hours of sleep every night.  Find ways to manage stress, such as meditation, deep breathing, or yoga.        General instructions      Keep a journal to find out what may trigger your migraine headaches. For example, write down:    What you eat and drink.  How much sleep you get.  Any change to your diet or medicines.  If you have a migraine headache:    Avoid things that make your symptoms worse, such as bright lights.  It may help to lie down in a dark, quiet room.  Do not drive or use heavy machinery.  Ask your health care provider what activities are safe for you while you are experiencing symptoms.  Keep all follow-up visits as told by your health care provider. This is important.    Contact a health care provider if:  You develop symptoms that are different or more severe than your usual migraine headache symptoms.  You have more than 15 headache days in one month.    Get help right away if:  Your migraine headache becomes severe.  Your migraine headache lasts longer than 72 hours.  You have a fever.  You have a stiff neck.  You have vision loss.  Your muscles feel weak or like you cannot control them.  You start to lose your balance often.  You have trouble walking.  You faint.  You have a seizure.    Summary  A migraine headache is an intense, throbbing pain on one side or both sides of the head. Migraines may also cause other symptoms, such as nausea, vomiting, and sensitivity to light and noise.  This condition may be treated with medicines and lifestyle changes. You may also need to avoid certain things that trigger a migraine headache.  Keep a journal to find out what may trigger your migraine headaches.  Contact your health care provider if you have more than 15 headache days in a month or you develop symptoms that are different or more severe than your usual migraine headache symptoms.    ADDITIONAL NOTES AND INSTRUCTIONS    Please follow up with your Primary MD in 24-48 hr.  Seek immediate medical care for any new/worsening signs or symptoms.

## 2025-01-11 ENCOUNTER — NON-APPOINTMENT (OUTPATIENT)
Age: 30
End: 2025-01-11

## 2025-01-11 RX ORDER — VITAMIN A 10000 UNIT
1 TABLET ORAL
Refills: 0 | DISCHARGE

## 2025-01-11 RX ORDER — LITHIUM CARBONATE EXTENDED-RELEASE TABLET 300 MG/1
1 TABLET, FILM COATED, EXTENDED RELEASE ORAL
Refills: 0 | DISCHARGE

## 2025-01-14 ENCOUNTER — APPOINTMENT (OUTPATIENT)
Dept: PULMONOLOGY | Facility: CLINIC | Age: 30
End: 2025-01-14
Payer: COMMERCIAL

## 2025-01-14 VITALS
OXYGEN SATURATION: 96 % | WEIGHT: 219 LBS | SYSTOLIC BLOOD PRESSURE: 104 MMHG | TEMPERATURE: 97.2 F | BODY MASS INDEX: 33.19 KG/M2 | HEIGHT: 68 IN | HEART RATE: 81 BPM | DIASTOLIC BLOOD PRESSURE: 78 MMHG | RESPIRATION RATE: 16 BRPM

## 2025-01-14 DIAGNOSIS — R76.8 OTHER SPECIFIED ABNORMAL IMMUNOLOGICAL FINDINGS IN SERUM: ICD-10-CM

## 2025-01-14 DIAGNOSIS — J45.909 UNSPECIFIED ASTHMA, UNCOMPLICATED: ICD-10-CM

## 2025-01-14 DIAGNOSIS — R06.02 SHORTNESS OF BREATH: ICD-10-CM

## 2025-01-14 DIAGNOSIS — L50.9 URTICARIA, UNSPECIFIED: ICD-10-CM

## 2025-01-14 DIAGNOSIS — J82.83 EOSINOPHILIC ASTHMA: ICD-10-CM

## 2025-01-14 DIAGNOSIS — E66.3 OVERWEIGHT: ICD-10-CM

## 2025-01-14 DIAGNOSIS — T78.40XA ALLERGY, UNSPECIFIED, INITIAL ENCOUNTER: ICD-10-CM

## 2025-01-14 PROBLEM — G47.33 OBSTRUCTIVE SLEEP APNEA (ADULT) (PEDIATRIC): Chronic | Status: ACTIVE | Noted: 2025-01-11

## 2025-01-14 PROCEDURE — 95012 NITRIC OXIDE EXP GAS DETER: CPT

## 2025-01-14 PROCEDURE — 94010 BREATHING CAPACITY TEST: CPT

## 2025-01-14 PROCEDURE — 99214 OFFICE O/P EST MOD 30 MIN: CPT | Mod: 25

## 2025-01-14 RX ORDER — DIVALPROEX SODIUM 500 MG/1
TABLET, DELAYED RELEASE ORAL
Refills: 0 | Status: ACTIVE | COMMUNITY

## 2025-01-14 RX ORDER — LITHIUM CARBONATE 600 MG/1
CAPSULE ORAL
Refills: 0 | Status: ACTIVE | COMMUNITY

## 2025-01-28 ENCOUNTER — APPOINTMENT (OUTPATIENT)
Dept: PSYCHIATRY | Facility: CLINIC | Age: 30
End: 2025-01-28

## 2025-01-28 PROCEDURE — 90837 PSYTX W PT 60 MINUTES: CPT | Mod: 95

## 2025-02-07 ENCOUNTER — APPOINTMENT (OUTPATIENT)
Dept: PAIN MANAGEMENT | Facility: CLINIC | Age: 30
End: 2025-02-07
Payer: COMMERCIAL

## 2025-02-07 VITALS
BODY MASS INDEX: 28.1 KG/M2 | HEIGHT: 73 IN | HEART RATE: 84 BPM | DIASTOLIC BLOOD PRESSURE: 71 MMHG | SYSTOLIC BLOOD PRESSURE: 127 MMHG | WEIGHT: 212 LBS

## 2025-02-07 PROCEDURE — 99214 OFFICE O/P EST MOD 30 MIN: CPT

## 2025-02-10 ENCOUNTER — APPOINTMENT (OUTPATIENT)
Dept: PSYCHIATRY | Facility: CLINIC | Age: 30
End: 2025-02-10
Payer: SELF-PAY

## 2025-02-10 PROCEDURE — 90837 PSYTX W PT 60 MINUTES: CPT | Mod: 95

## 2025-02-12 DIAGNOSIS — G43.709 CHRONIC MIGRAINE W/OUT AURA, NOT INTRACTABLE, W/OUT STATUS MIGRAINOSUS: ICD-10-CM

## 2025-02-14 RX ORDER — RIMEGEPANT SULFATE 75 MG/75MG
75 TABLET, ORALLY DISINTEGRATING ORAL
Qty: 8 | Refills: 5 | Status: ACTIVE | COMMUNITY
Start: 2025-02-12 | End: 1900-01-01

## 2025-02-24 ENCOUNTER — APPOINTMENT (OUTPATIENT)
Dept: PAIN MANAGEMENT | Facility: CLINIC | Age: 30
End: 2025-02-24

## 2025-02-24 ENCOUNTER — APPOINTMENT (OUTPATIENT)
Dept: PSYCHIATRY | Facility: CLINIC | Age: 30
End: 2025-02-24

## 2025-02-24 PROCEDURE — 90837 PSYTX W PT 60 MINUTES: CPT | Mod: 95

## 2025-02-26 NOTE — ED ADULT NURSE NOTE - CAS DISCH BELONGINGS RETURNED
[FreeTextEntry1] : 73-year-old male with left lower extremity subacute limb ischemia status post iliofemoral thrombectomy of the left posterior tibial artery thrombectomy and common femoral to posterior tibial artery bypass with PTFE as well as right popliteal artery embolectomy and 4 compartment fasciotomies and left brachial patch angioplasty with bovine pericardium with left groin wound dehiscence requiring explant of proximal PTFE graft and vein patch of artery and rectus flap  ABIs completed in the office today demonstrate normal SHERON on the right with an SHERON of 0.5 on the left.  He has seen podiatry and the hallux nail has been removed however he does have ischemic changes to the distal aspect of the hallux likely due to ischemia with a known occluded SFA and popliteal artery.  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  He is at high risk for limb loss he is aware of this.  Other options include redo profunda mid posterior tibial artery bypass though this has low likelihood of being successful.  He will return to vascular clinic in 4 weeks for reevaluation and consideration of redo bypass versus primary below-knee amputation. If redo bypass is to be pursued will perform vein mapping of lower extremities and arms at next office visit
Not applicable

## 2025-03-17 ENCOUNTER — APPOINTMENT (OUTPATIENT)
Dept: PSYCHIATRY | Facility: CLINIC | Age: 30
End: 2025-03-17

## 2025-03-17 PROCEDURE — 90837 PSYTX W PT 60 MINUTES: CPT | Mod: 95

## 2025-03-31 ENCOUNTER — APPOINTMENT (OUTPATIENT)
Dept: PSYCHIATRY | Facility: CLINIC | Age: 30
End: 2025-03-31

## 2025-03-31 PROCEDURE — 90837 PSYTX W PT 60 MINUTES: CPT | Mod: 95

## 2025-04-14 ENCOUNTER — APPOINTMENT (OUTPATIENT)
Dept: PSYCHIATRY | Facility: CLINIC | Age: 30
End: 2025-04-14
Payer: COMMERCIAL

## 2025-04-14 PROCEDURE — 90837 PSYTX W PT 60 MINUTES: CPT | Mod: 95

## 2025-04-28 ENCOUNTER — APPOINTMENT (OUTPATIENT)
Dept: PSYCHIATRY | Facility: CLINIC | Age: 30
End: 2025-04-28
Payer: COMMERCIAL

## 2025-04-28 PROCEDURE — 90837 PSYTX W PT 60 MINUTES: CPT | Mod: 95

## 2025-05-13 ENCOUNTER — APPOINTMENT (OUTPATIENT)
Dept: PSYCHIATRY | Facility: CLINIC | Age: 30
End: 2025-05-13

## 2025-05-13 PROCEDURE — 90837 PSYTX W PT 60 MINUTES: CPT | Mod: 95

## 2025-05-21 ENCOUNTER — NON-APPOINTMENT (OUTPATIENT)
Age: 30
End: 2025-05-21

## 2025-05-27 ENCOUNTER — APPOINTMENT (OUTPATIENT)
Dept: PSYCHIATRY | Facility: CLINIC | Age: 30
End: 2025-05-27

## 2025-05-27 PROCEDURE — 90837 PSYTX W PT 60 MINUTES: CPT | Mod: 95

## 2025-06-10 ENCOUNTER — APPOINTMENT (OUTPATIENT)
Dept: PSYCHIATRY | Facility: CLINIC | Age: 30
End: 2025-06-10
Payer: SELF-PAY

## 2025-06-10 PROCEDURE — 90837 PSYTX W PT 60 MINUTES: CPT | Mod: 95

## 2025-06-17 ENCOUNTER — APPOINTMENT (OUTPATIENT)
Dept: INTERNAL MEDICINE | Facility: CLINIC | Age: 30
End: 2025-06-17
Payer: COMMERCIAL

## 2025-06-17 VITALS
HEIGHT: 73 IN | BODY MASS INDEX: 27.3 KG/M2 | WEIGHT: 206 LBS | DIASTOLIC BLOOD PRESSURE: 84 MMHG | HEART RATE: 65 BPM | TEMPERATURE: 98 F | SYSTOLIC BLOOD PRESSURE: 120 MMHG | OXYGEN SATURATION: 98 % | RESPIRATION RATE: 14 BRPM

## 2025-06-17 PROBLEM — M25.519 SHOULDER PAIN: Status: ACTIVE | Noted: 2025-06-17

## 2025-06-17 PROBLEM — F31.9 BIPOLAR 1 DISORDER: Status: ACTIVE | Noted: 2025-06-17

## 2025-06-17 PROBLEM — R22.30 NODULE OF FINGER: Status: ACTIVE | Noted: 2025-06-17

## 2025-06-17 PROBLEM — M41.127 ADOLESCENT IDIOPATHIC SCOLIOSIS OF LUMBOSACRAL SPINE: Status: ACTIVE | Noted: 2025-06-17

## 2025-06-17 PROCEDURE — 99385 PREV VISIT NEW AGE 18-39: CPT

## 2025-06-18 LAB
25(OH)D3 SERPL-MCNC: 98.5 NG/ML
ALBUMIN SERPL ELPH-MCNC: 4.7 G/DL
ALP BLD-CCNC: 49 U/L
ALT SERPL-CCNC: 20 U/L
ANION GAP SERPL CALC-SCNC: 14 MMOL/L
APPEARANCE: CLEAR
AST SERPL-CCNC: 25 U/L
BACTERIA: NEGATIVE /HPF
BASOPHILS # BLD AUTO: 0.02 K/UL
BASOPHILS NFR BLD AUTO: 0.3 %
BILIRUB SERPL-MCNC: 0.4 MG/DL
BILIRUBIN URINE: NEGATIVE
BLOOD URINE: NEGATIVE
BUN SERPL-MCNC: 23 MG/DL
CALCIUM SERPL-MCNC: 10 MG/DL
CAST: 0 /LPF
CHLORIDE SERPL-SCNC: 103 MMOL/L
CHOLEST SERPL-MCNC: 171 MG/DL
CO2 SERPL-SCNC: 24 MMOL/L
COLOR: YELLOW
CREAT SERPL-MCNC: 1.01 MG/DL
EGFRCR SERPLBLD CKD-EPI 2021: 105 ML/MIN/1.73M2
EOSINOPHIL # BLD AUTO: 0.29 K/UL
EOSINOPHIL NFR BLD AUTO: 4.9 %
EPITHELIAL CELLS: 0 /HPF
ESTIMATED AVERAGE GLUCOSE: 88 MG/DL
FOLATE SERPL-MCNC: >20 NG/ML
GLUCOSE QUALITATIVE U: NEGATIVE MG/DL
GLUCOSE SERPL-MCNC: 77 MG/DL
HBA1C MFR BLD HPLC: 4.7 %
HCT VFR BLD CALC: 46.6 %
HDLC SERPL-MCNC: 53 MG/DL
HGB BLD-MCNC: 15.5 G/DL
IMM GRANULOCYTES NFR BLD AUTO: 0.2 %
KETONES URINE: NEGATIVE MG/DL
LDLC SERPL-MCNC: 102 MG/DL
LEUKOCYTE ESTERASE URINE: NEGATIVE
LYMPHOCYTES # BLD AUTO: 2.37 K/UL
LYMPHOCYTES NFR BLD AUTO: 39.8 %
MAN DIFF?: NORMAL
MCHC RBC-ENTMCNC: 31 PG
MCHC RBC-ENTMCNC: 33.3 G/DL
MCV RBC AUTO: 93.2 FL
MEV IGG FLD QL IA: >300 AU/ML
MEV IGG+IGM SER-IMP: POSITIVE
MICROSCOPIC-UA: NORMAL
MONOCYTES # BLD AUTO: 0.49 K/UL
MONOCYTES NFR BLD AUTO: 8.2 %
NEUTROPHILS # BLD AUTO: 2.77 K/UL
NEUTROPHILS NFR BLD AUTO: 46.6 %
NITRITE URINE: NEGATIVE
NONHDLC SERPL-MCNC: 118 MG/DL
PH URINE: 6.5
PLATELET # BLD AUTO: 215 K/UL
POTASSIUM SERPL-SCNC: 4.7 MMOL/L
PROT SERPL-MCNC: 6.7 G/DL
PROTEIN URINE: NEGATIVE MG/DL
RBC # BLD: 5 M/UL
RBC # FLD: 11.6 %
RED BLOOD CELLS URINE: 0 /HPF
SODIUM SERPL-SCNC: 141 MMOL/L
SPECIFIC GRAVITY URINE: 1.02
TRIGL SERPL-MCNC: 86 MG/DL
TSH SERPL-ACNC: 2.86 UIU/ML
UROBILINOGEN URINE: 0.2 MG/DL
VIT B12 SERPL-MCNC: 430 PG/ML
WBC # FLD AUTO: 5.95 K/UL
WHITE BLOOD CELLS URINE: 0 /HPF

## 2025-06-24 NOTE — ED ADULT NURSE NOTE - NSSUHOSCREENINGYN_ED_ALL_ED
Zepbound Pending    Insurance response  Prescription Drug Insurance: [employer]  Notes: Prior authorization submitted - will update provider when decision has been made by insurance.     Baseline weight:  253 lbs BMI 44.85 (6/23/25)  EPA    -To qualify for INITIAL renewal a 5% weight loss of BASELINE weight is required, depending on their insurance.  -For subsequent RENEWALS (maintenance) the patient needs to at least maintain that 5% weightloss and/or continue to lose more weight.  -Please make sure patient is aware this is to be used in adjunction with diet and exercise to help meet their weight loss goal.     Yes - the patient is able to be screened

## 2025-06-26 ENCOUNTER — APPOINTMENT (OUTPATIENT)
Dept: PSYCHIATRY | Facility: CLINIC | Age: 30
End: 2025-06-26
Payer: SELF-PAY

## 2025-06-26 PROCEDURE — 90837 PSYTX W PT 60 MINUTES: CPT | Mod: 95

## 2025-07-08 ENCOUNTER — APPOINTMENT (OUTPATIENT)
Dept: PSYCHIATRY | Facility: CLINIC | Age: 30
End: 2025-07-08
Payer: SELF-PAY

## 2025-07-08 PROCEDURE — 90837 PSYTX W PT 60 MINUTES: CPT | Mod: 95

## 2025-07-09 ENCOUNTER — NON-APPOINTMENT (OUTPATIENT)
Age: 30
End: 2025-07-09

## 2025-07-09 ENCOUNTER — APPOINTMENT (OUTPATIENT)
Dept: ORTHOPEDIC SURGERY | Facility: CLINIC | Age: 30
End: 2025-07-09
Payer: COMMERCIAL

## 2025-07-09 VITALS — HEIGHT: 73 IN | WEIGHT: 196 LBS | BODY MASS INDEX: 25.98 KG/M2

## 2025-07-09 PROBLEM — M54.6 THORACIC BACK PAIN: Status: ACTIVE | Noted: 2025-07-09

## 2025-07-09 PROBLEM — M54.9 MID BACK PAIN: Status: ACTIVE | Noted: 2025-07-09

## 2025-07-09 PROBLEM — M41.9 SCOLIOSIS: Status: ACTIVE | Noted: 2025-07-09

## 2025-07-09 PROBLEM — R22.31 MASS OF FINGER OF RIGHT HAND: Status: ACTIVE | Noted: 2025-07-09

## 2025-07-09 PROCEDURE — 72100 X-RAY EXAM L-S SPINE 2/3 VWS: CPT

## 2025-07-09 PROCEDURE — 99203 OFFICE O/P NEW LOW 30 MIN: CPT

## 2025-07-09 PROCEDURE — 99204 OFFICE O/P NEW MOD 45 MIN: CPT

## 2025-07-09 PROCEDURE — 72070 X-RAY EXAM THORAC SPINE 2VWS: CPT

## 2025-07-09 PROCEDURE — 73140 X-RAY EXAM OF FINGER(S): CPT | Mod: RT

## 2025-07-15 ENCOUNTER — EMERGENCY (EMERGENCY)
Facility: HOSPITAL | Age: 30
LOS: 1 days | End: 2025-07-15
Attending: EMERGENCY MEDICINE | Admitting: EMERGENCY MEDICINE
Payer: COMMERCIAL

## 2025-07-15 VITALS
WEIGHT: 207.9 LBS | RESPIRATION RATE: 16 BRPM | OXYGEN SATURATION: 97 % | HEART RATE: 81 BPM | SYSTOLIC BLOOD PRESSURE: 112 MMHG | HEIGHT: 73 IN | TEMPERATURE: 99 F | DIASTOLIC BLOOD PRESSURE: 78 MMHG

## 2025-07-15 VITALS
RESPIRATION RATE: 17 BRPM | OXYGEN SATURATION: 97 % | DIASTOLIC BLOOD PRESSURE: 68 MMHG | HEART RATE: 71 BPM | TEMPERATURE: 99 F | SYSTOLIC BLOOD PRESSURE: 115 MMHG

## 2025-07-15 LAB
ALBUMIN SERPL ELPH-MCNC: 3.7 G/DL — SIGNIFICANT CHANGE UP (ref 3.3–5)
ALP SERPL-CCNC: 53 U/L — SIGNIFICANT CHANGE UP (ref 30–120)
ALT FLD-CCNC: 25 U/L — SIGNIFICANT CHANGE UP (ref 10–60)
ANION GAP SERPL CALC-SCNC: 7 MMOL/L — SIGNIFICANT CHANGE UP (ref 5–17)
APPEARANCE UR: CLEAR — SIGNIFICANT CHANGE UP
AST SERPL-CCNC: 17 U/L — SIGNIFICANT CHANGE UP (ref 10–40)
BASOPHILS # BLD AUTO: 0.03 K/UL — SIGNIFICANT CHANGE UP (ref 0–0.2)
BASOPHILS NFR BLD AUTO: 0.5 % — SIGNIFICANT CHANGE UP (ref 0–2)
BILIRUB SERPL-MCNC: 0.3 MG/DL — SIGNIFICANT CHANGE UP (ref 0.2–1.2)
BILIRUB UR-MCNC: NEGATIVE — SIGNIFICANT CHANGE UP
BUN SERPL-MCNC: 18 MG/DL — SIGNIFICANT CHANGE UP (ref 7–23)
CALCIUM SERPL-MCNC: 9.1 MG/DL — SIGNIFICANT CHANGE UP (ref 8.4–10.5)
CHLORIDE SERPL-SCNC: 104 MMOL/L — SIGNIFICANT CHANGE UP (ref 96–108)
CO2 SERPL-SCNC: 30 MMOL/L — SIGNIFICANT CHANGE UP (ref 22–31)
COLOR SPEC: YELLOW — SIGNIFICANT CHANGE UP
CREAT SERPL-MCNC: 1.06 MG/DL — SIGNIFICANT CHANGE UP (ref 0.5–1.3)
DIFF PNL FLD: NEGATIVE — SIGNIFICANT CHANGE UP
EGFR: 99 ML/MIN/1.73M2 — SIGNIFICANT CHANGE UP
EGFR: 99 ML/MIN/1.73M2 — SIGNIFICANT CHANGE UP
EOSINOPHIL # BLD AUTO: 0.26 K/UL — SIGNIFICANT CHANGE UP (ref 0–0.5)
EOSINOPHIL NFR BLD AUTO: 4 % — SIGNIFICANT CHANGE UP (ref 0–6)
GLUCOSE SERPL-MCNC: 95 MG/DL — SIGNIFICANT CHANGE UP (ref 70–99)
GLUCOSE UR QL: NEGATIVE MG/DL — SIGNIFICANT CHANGE UP
HCT VFR BLD CALC: 45.6 % — SIGNIFICANT CHANGE UP (ref 39–50)
HGB BLD-MCNC: 15.5 G/DL — SIGNIFICANT CHANGE UP (ref 13–17)
IMM GRANULOCYTES # BLD AUTO: 0.02 K/UL — SIGNIFICANT CHANGE UP (ref 0–0.07)
IMM GRANULOCYTES NFR BLD AUTO: 0.3 % — SIGNIFICANT CHANGE UP (ref 0–0.9)
KETONES UR QL: NEGATIVE MG/DL — SIGNIFICANT CHANGE UP
LEUKOCYTE ESTERASE UR-ACNC: NEGATIVE — SIGNIFICANT CHANGE UP
LIDOCAIN IGE QN: 24 U/L — SIGNIFICANT CHANGE UP (ref 16–77)
LYMPHOCYTES # BLD AUTO: 2.25 K/UL — SIGNIFICANT CHANGE UP (ref 1–3.3)
LYMPHOCYTES NFR BLD AUTO: 34.5 % — SIGNIFICANT CHANGE UP (ref 13–44)
MCHC RBC-ENTMCNC: 30.9 PG — SIGNIFICANT CHANGE UP (ref 27–34)
MCHC RBC-ENTMCNC: 34 G/DL — SIGNIFICANT CHANGE UP (ref 32–36)
MCV RBC AUTO: 91 FL — SIGNIFICANT CHANGE UP (ref 80–100)
MONOCYTES # BLD AUTO: 0.52 K/UL — SIGNIFICANT CHANGE UP (ref 0–0.9)
MONOCYTES NFR BLD AUTO: 8 % — SIGNIFICANT CHANGE UP (ref 2–14)
NEUTROPHILS # BLD AUTO: 3.44 K/UL — SIGNIFICANT CHANGE UP (ref 1.8–7.4)
NEUTROPHILS NFR BLD AUTO: 52.7 % — SIGNIFICANT CHANGE UP (ref 43–77)
NITRITE UR-MCNC: NEGATIVE — SIGNIFICANT CHANGE UP
NRBC # BLD AUTO: 0 K/UL — SIGNIFICANT CHANGE UP (ref 0–0)
NRBC # FLD: 0 K/UL — SIGNIFICANT CHANGE UP (ref 0–0)
NRBC BLD AUTO-RTO: 0 /100 WBCS — SIGNIFICANT CHANGE UP (ref 0–0)
PH UR: 6.5 — SIGNIFICANT CHANGE UP (ref 5–8)
PLATELET # BLD AUTO: 241 K/UL — SIGNIFICANT CHANGE UP (ref 150–400)
PMV BLD: 11.2 FL — SIGNIFICANT CHANGE UP (ref 7–13)
POTASSIUM SERPL-MCNC: 4.4 MMOL/L — SIGNIFICANT CHANGE UP (ref 3.5–5.3)
POTASSIUM SERPL-SCNC: 4.4 MMOL/L — SIGNIFICANT CHANGE UP (ref 3.5–5.3)
PROT SERPL-MCNC: 6.6 G/DL — SIGNIFICANT CHANGE UP (ref 6–8.3)
PROT UR-MCNC: NEGATIVE MG/DL — SIGNIFICANT CHANGE UP
RBC # BLD: 5.01 M/UL — SIGNIFICANT CHANGE UP (ref 4.2–5.8)
RBC # FLD: 11.8 % — SIGNIFICANT CHANGE UP (ref 10.3–14.5)
SODIUM SERPL-SCNC: 141 MMOL/L — SIGNIFICANT CHANGE UP (ref 135–145)
SP GR SPEC: 1.01 — SIGNIFICANT CHANGE UP (ref 1–1.03)
UROBILINOGEN FLD QL: 1 MG/DL — SIGNIFICANT CHANGE UP (ref 0.2–1)
WBC # BLD: 6.52 K/UL — SIGNIFICANT CHANGE UP (ref 3.8–10.5)
WBC # FLD AUTO: 6.52 K/UL — SIGNIFICANT CHANGE UP (ref 3.8–10.5)

## 2025-07-15 PROCEDURE — 83690 ASSAY OF LIPASE: CPT

## 2025-07-15 PROCEDURE — 99285 EMERGENCY DEPT VISIT HI MDM: CPT

## 2025-07-15 PROCEDURE — 80053 COMPREHEN METABOLIC PANEL: CPT

## 2025-07-15 PROCEDURE — 81003 URINALYSIS AUTO W/O SCOPE: CPT

## 2025-07-15 PROCEDURE — 74177 CT ABD & PELVIS W/CONTRAST: CPT

## 2025-07-15 PROCEDURE — 96360 HYDRATION IV INFUSION INIT: CPT | Mod: XU

## 2025-07-15 PROCEDURE — 85025 COMPLETE CBC W/AUTO DIFF WBC: CPT

## 2025-07-15 PROCEDURE — 99284 EMERGENCY DEPT VISIT MOD MDM: CPT | Mod: 25

## 2025-07-15 PROCEDURE — 74177 CT ABD & PELVIS W/CONTRAST: CPT | Mod: 26

## 2025-07-15 PROCEDURE — 36415 COLL VENOUS BLD VENIPUNCTURE: CPT

## 2025-07-15 RX ORDER — LITHIUM CARBONATE 600 MG/1
2 CAPSULE, GELATIN COATED ORAL
Refills: 0 | DISCHARGE

## 2025-07-15 RX ADMIN — Medication 1000 MILLILITER(S): at 13:50

## 2025-07-15 RX ADMIN — Medication 1000 MILLILITER(S): at 14:48

## 2025-07-15 NOTE — ED PROVIDER NOTE - PATIENT PORTAL LINK FT
You can access the FollowMyHealth Patient Portal offered by [facility] by registering at the following website: [email or web address]. By joining [employer]’s FollowMyHealth portal, you will also be able to view your health information using other applications (apps) compatible with our system.

## 2025-07-15 NOTE — ED PROVIDER NOTE - NONTENDER LOCATION
no rebound or guarding/left upper quadrant/right upper quadrant/left lower quadrant/right lower quadrant/periumbilical/umbilical/suprapubic/left costovertebral angle/right costovertebral angle yes

## 2025-07-15 NOTE — ED PROVIDER NOTE - HOW PATIENT ADDRESSED, PROFILE
Called and spoke with patient re: no show to today's PT appointment. She stated that her pain has resolved, that she thought that all her appointments had been cancelled per her last f/u appointment with Pema Orta, PT. Pt reports some soreness from that PT session but that c/o have fully resolved. Pt declined attending any additional PT. Advised pt to f/u with PCP for any c/o or unresolved c/o.
Lisandro

## 2025-07-15 NOTE — ED PROVIDER NOTE - CLINICAL SUMMARY MEDICAL DECISION MAKING FREE TEXT BOX
Patient is a 29-year-old male who presents to the emergency room with a chief complaint of abdominal pain.  Past medical history of bipolar disorder anxiety depression and asthma.  Patient is presenting with abdominal pain for last 2 to 3 weeks.  Does endorse that he had been in [place] ate a lot of [place] 3 weeks ago and has had intermittent abdominal pain with gurgling and diarrhea since.  Pain is mid abdomen.  Last episode of diarrhea was 3 days ago.  Denies any fevers nausea vomiting chest pain or shortness of breath.  Does have an appointment with gastroenterology on Thursday.  On exam patient is lying in bed no acute distress normocephalic atraumatic pupils equal round and reactive heart is regular rate lungs clear to auscultation abdomen is soft nontender nondistended positive bowel sounds noted.  Patient's presenting to the emergency room with abdominal pain.  Differential includes but not limited to viral infection versus gastritis versus colitis versus additional pathology.  Will obtain screening labs CT imaging check UA and culture and monitor.  Ultimate clinical disposition will be pending results.  Independent review of CT imaging reveals bladder wall thickening UA not concerning for infection.  No bowel obstruction appendix does contain a proximal appendicolith otherwise is within normal patient with no right lower quadrant pain normal white count pain has been present for weeks the likelihood for acute appendicitis trace pelvic fluid no lymphadenopathy.  Med rec was reviewed will begin Protonix and have patient follow-up with GI.  Copy of results were provided all questions answered stable for discharge

## 2025-07-15 NOTE — ED PROVIDER NOTE - PROGRESS NOTE DETAILS
Reevaluated patient at bedside.  Patient feeling much improved. Abd soft and NT on re-eval. Discussed the results of all diagnostic testing in ED and copies of all reports given. Advised to f/u with GI on Thursday, will rx protonix. strict return precautions given. An opportunity to ask questions was given.  Discussed the importance of prompt, close medical follow-up.  Patient will return with any changes, concerns or persistent / worsening symptoms.  Understanding of all instructions verbalized. Reevaluated patient at bedside.  Patient feeling much improved. Abd soft and NT on re-eval. Discussed the results of all diagnostic testing in ED and copies of all reports given. Advised to f/u with GI on Thursday, will rx protonix. strict return precautions given. Informed about urinary bladder wall thickening and proximal appendicolith. An opportunity to ask questions was given.  Discussed the importance of prompt, close medical follow-up.  Patient will return with any changes, concerns or persistent / worsening symptoms.  Understanding of all instructions verbalized.

## 2025-07-15 NOTE — ED PROVIDER NOTE - DIFFERENTIAL DIAGNOSIS
Differential Diagnosis Patient's presenting to the emergency room with abdominal pain.  Differential includes but not limited to viral infection versus gastritis versus colitis versus additional pathology.  Will obtain screening labs CT imaging check UA and culture and monitor.  Ultimate clinical disposition will be pending results.

## 2025-07-15 NOTE — ED ADULT NURSE NOTE - OBJECTIVE STATEMENT
pt is A&Ox3, ambulatory, able to make needs known and presents with C/O diffuse ab pain and colored stool x 3 weeks. PT reports recent travel to [place] which coordinated with symptom onset. PT denies any nausea, vomiting or fever. PT well appearing at time of assessment.

## 2025-07-15 NOTE — ED PROVIDER NOTE - OBJECTIVE STATEMENT
29-year-old male with history of bipolar disorder, anxiety, depression, asthma presents with complaint of abdominal pain x 2 to 3 weeks.  Patient states that he was in [place] for 1 week and ate a lot of Mexican cuisine.  States that he returned 3 weeks ago and since has had intermittent abdominal pain and gurgling with occasional non bloody diarrhea. Denies fever, chills, N/V, hx of abdominal surgeries, urinary symptoms. Denies any abdominal pain at this time and declined pain meds. States that he has appt with GI on 7/17. 29-year-old male with history of bipolar disorder, anxiety, depression, asthma presents with complaint of abdominal pain x 2 to 3 weeks.  Patient states that he was in [place] for 1 week and ate a lot of Mexican cuisine.  States that he returned 3 weeks ago and since has had intermittent abdominal pain and gurgling with occasional non bloody diarrhea. Last episode of diarrhea was 3 days ago. Denies fever, chills, N/V, hx of abdominal surgeries, urinary symptoms. Denies any abdominal pain at this time and declined pain meds. States that he has appt with GI on 7/17.

## 2025-07-15 NOTE — ED PROVIDER NOTE - CARE PROVIDER_API CALL
YOUR GASTROENTEROLOGIST,   Phone: (   )    -  Fax: (   )    -  Follow Up Time: 1-3 Days    Myles Rodgers)  Gastroenterology  [address] [place] [id number]  Phone: [phone]  Fax: [phone]  Follow Up Time: 1-3 Days

## 2025-07-15 NOTE — ED ADULT NURSE NOTE - FINAL NURSING ELECTRONIC SIGNATURE
Pre-procedure checklist reviewed, AUC complete and pre-sedation note complete.    MD aware of maximum contrast dose of 270 mL. 15-Jul-2025 15:36

## 2025-07-15 NOTE — ED ADULT NURSE NOTE - NSFALLUNIVINTERV_ED_ALL_ED
Bed/Stretcher in lowest position, wheels locked, appropriate side rails in place/Call bell, personal items and telephone in reach/Instruct patient to call for assistance before getting out of bed/chair/stretcher/Non-slip footwear applied when patient is off stretcher/[place] to call system/Physically safe environment - no spills, clutter or unnecessary equipment/Purposeful proactive rounding/Room/bathroom lighting operational, light cord in reach

## 2025-07-15 NOTE — ED PROVIDER NOTE - NSFOLLOWUPINSTRUCTIONS_ED_ALL_ED_FT
Follow-up with gastroenterologist this week for reevaluation, ongoing care and treatment.  Rest, stay hydrated.  Take Protonix as prescribed.  If having worsening of symptoms, fever, vomiting or other related symptoms, return to the ER immediately.    Abdominal Pain, Adult  A doctor talking to a person during a medical exam.  Many things can cause belly (abdominal) pain. In most cases, belly pain is not a serious problem and can be watched and treated at home. But in some cases, it can be serious.    Your doctor will try to find the cause of your belly pain.    Follow these instructions at home:  Medicines    Take over-the-counter and prescription medicines only as told by your doctor.  Do not take medicines that help you poop (laxatives) unless told by your doctor.  General instructions    Watch your belly pain for any changes. Tell your doctor if the pain gets worse.  Drink enough fluid to keep your pee (urine) pale yellow.  Contact a doctor if:  Your belly pain changes or gets worse.  You have very bad cramping or bloating in your belly.  You vomit.  Your pain gets worse with meals, after eating, or with certain foods.  You have trouble pooping or have watery poop for more than 2–3 days.  You are not hungry, or you lose weight without trying.  You have signs of not getting enough fluid or water (dehydration). These may include:  Dark pee, very little pee, or no pee.  Cracked lips or dry mouth.  Feeling sleepy or weak.  You have pain when you pee or poop.  Your belly pain wakes you up at night.  You have blood in your pee.  You have a fever.  Get help right away if:  You cannot stop vomiting.  Your pain is only in one part of your belly, like on the right side.  You have bloody or black poop, or poop that looks like tar.  You have trouble breathing.  You have chest pain.  These symptoms may be an emergency. Get help right away. Call 911.  Do not wait to see if the symptoms will go away.  Do not drive yourself to the hospital.

## 2025-07-17 ENCOUNTER — APPOINTMENT (OUTPATIENT)
Dept: GASTROENTEROLOGY | Facility: CLINIC | Age: 30
End: 2025-07-17
Payer: COMMERCIAL

## 2025-07-17 VITALS
RESPIRATION RATE: 14 BRPM | HEART RATE: 70 BPM | DIASTOLIC BLOOD PRESSURE: 74 MMHG | TEMPERATURE: 97 F | BODY MASS INDEX: 27.09 KG/M2 | SYSTOLIC BLOOD PRESSURE: 120 MMHG | HEIGHT: 73 IN | WEIGHT: 204.38 LBS | OXYGEN SATURATION: 99 %

## 2025-07-17 PROCEDURE — G2211 COMPLEX E/M VISIT ADD ON: CPT | Mod: NC

## 2025-07-17 PROCEDURE — 99204 OFFICE O/P NEW MOD 45 MIN: CPT

## 2025-07-17 RX ORDER — DICYCLOMINE HYDROCHLORIDE 20 MG/1
20 TABLET ORAL
Qty: 90 | Refills: 3 | Status: ACTIVE | COMMUNITY
Start: 2025-07-17 | End: 1900-01-01

## 2025-07-18 ENCOUNTER — LABORATORY RESULT (OUTPATIENT)
Age: 30
End: 2025-07-18

## 2025-07-18 ENCOUNTER — APPOINTMENT (OUTPATIENT)
Dept: MRI IMAGING | Facility: CLINIC | Age: 30
End: 2025-07-18

## 2025-07-18 ENCOUNTER — OUTPATIENT (OUTPATIENT)
Dept: OUTPATIENT SERVICES | Facility: HOSPITAL | Age: 30
LOS: 1 days | End: 2025-07-18
Payer: COMMERCIAL

## 2025-07-18 DIAGNOSIS — Z00.8 ENCOUNTER FOR OTHER GENERAL EXAMINATION: ICD-10-CM

## 2025-07-18 DIAGNOSIS — M54.9 DORSALGIA, UNSPECIFIED: ICD-10-CM

## 2025-07-18 PROCEDURE — 72146 MRI CHEST SPINE W/O DYE: CPT

## 2025-07-18 PROCEDURE — 72146 MRI CHEST SPINE W/O DYE: CPT | Mod: 26

## 2025-07-19 ENCOUNTER — LABORATORY RESULT (OUTPATIENT)
Age: 30
End: 2025-07-19

## 2025-07-19 PROBLEM — F31.9 BIPOLAR I DISORDER: Status: RESOLVED | Noted: 2025-07-19 | Resolved: 2025-07-19

## 2025-07-19 PROBLEM — R10.32 INTERMITTENT LEFT LOWER QUADRANT ABDOMINAL PAIN: Status: ACTIVE | Noted: 2025-07-17

## 2025-07-19 LAB
ENTEROPATHOGENIC E. COLI (EPEC): DETECTED
GI PCR PANEL: DETECTED

## 2025-07-19 RX ORDER — SODIUM PICOSULFATE, MAGNESIUM OXIDE, AND ANHYDROUS CITRIC ACID 12; 3.5; 1 G/175ML; G/175ML; MG/175ML
10-3.5-12 MG-GM LIQUID ORAL TWICE DAILY
Qty: 2 | Refills: 0 | Status: ACTIVE | COMMUNITY
Start: 2025-07-19 | End: 1900-01-01

## 2025-07-19 RX ORDER — CIPROFLOXACIN HYDROCHLORIDE 500 MG/1
500 TABLET, FILM COATED ORAL
Qty: 14 | Refills: 1 | Status: ACTIVE | COMMUNITY
Start: 2025-07-19 | End: 1900-01-01

## 2025-07-21 LAB
C DIFF TOXIN B QL PCR REFLEX: NORMAL
GDH ANTIGEN: DETECTED
TOXIN A AND B: NOT DETECTED

## 2025-07-22 ENCOUNTER — NON-APPOINTMENT (OUTPATIENT)
Age: 30
End: 2025-07-22

## 2025-07-22 ENCOUNTER — APPOINTMENT (OUTPATIENT)
Dept: OPHTHALMOLOGY | Facility: CLINIC | Age: 30
End: 2025-07-22
Payer: COMMERCIAL

## 2025-07-22 LAB
CALPROTECTIN FECAL: 573 UG/G
DEPRECATED O AND P PREP STL: NORMAL

## 2025-07-22 PROCEDURE — 92004 COMPRE OPH EXAM NEW PT 1/>: CPT

## 2025-07-27 ENCOUNTER — OUTPATIENT (OUTPATIENT)
Dept: OUTPATIENT SERVICES | Facility: HOSPITAL | Age: 30
LOS: 1 days | End: 2025-07-27
Payer: COMMERCIAL

## 2025-07-27 DIAGNOSIS — R22.31 LOCALIZED SWELLING, MASS AND LUMP, RIGHT UPPER LIMB: ICD-10-CM

## 2025-07-27 PROCEDURE — 73218 MRI UPPER EXTREMITY W/O DYE: CPT

## 2025-07-29 ENCOUNTER — APPOINTMENT (OUTPATIENT)
Dept: PSYCHIATRY | Facility: CLINIC | Age: 30
End: 2025-07-29
Payer: SELF-PAY

## 2025-07-29 PROCEDURE — 90837 PSYTX W PT 60 MINUTES: CPT | Mod: 95

## 2025-07-30 ENCOUNTER — APPOINTMENT (OUTPATIENT)
Dept: ORTHOPEDIC SURGERY | Facility: CLINIC | Age: 30
End: 2025-07-30

## 2025-07-31 ENCOUNTER — NON-APPOINTMENT (OUTPATIENT)
Age: 30
End: 2025-07-31

## 2025-08-04 ENCOUNTER — APPOINTMENT (OUTPATIENT)
Dept: PSYCHIATRY | Facility: CLINIC | Age: 30
End: 2025-08-04

## 2025-08-06 ENCOUNTER — APPOINTMENT (OUTPATIENT)
Dept: GASTROENTEROLOGY | Facility: AMBULATORY SURGERY CENTER | Age: 30
End: 2025-08-06

## 2025-08-11 ENCOUNTER — RX CHANGE (OUTPATIENT)
Age: 30
End: 2025-08-11

## 2025-08-11 RX ORDER — DICYCLOMINE HYDROCHLORIDE 20 MG/1
20 TABLET ORAL
Qty: 270 | Refills: 2 | Status: ACTIVE | COMMUNITY
Start: 1900-01-01 | End: 1900-01-01

## 2025-08-12 ENCOUNTER — APPOINTMENT (OUTPATIENT)
Dept: PSYCHIATRY | Facility: CLINIC | Age: 30
End: 2025-08-12

## 2025-08-12 PROCEDURE — 90837 PSYTX W PT 60 MINUTES: CPT | Mod: 95

## 2025-08-18 ENCOUNTER — APPOINTMENT (OUTPATIENT)
Dept: GASTROENTEROLOGY | Facility: CLINIC | Age: 30
End: 2025-08-18

## 2025-08-18 VITALS
WEIGHT: 205 LBS | HEART RATE: 76 BPM | BODY MASS INDEX: 27.17 KG/M2 | OXYGEN SATURATION: 98 % | HEIGHT: 73 IN | DIASTOLIC BLOOD PRESSURE: 64 MMHG | RESPIRATION RATE: 14 BRPM | SYSTOLIC BLOOD PRESSURE: 108 MMHG

## 2025-08-18 DIAGNOSIS — A09 INFECTIOUS GASTROENTERITIS AND COLITIS, UNSPECIFIED: ICD-10-CM

## 2025-08-18 DIAGNOSIS — K52.9 NONINFECTIVE GASTROENTERITIS AND COLITIS, UNSPECIFIED: ICD-10-CM

## 2025-08-18 PROCEDURE — 99213 OFFICE O/P EST LOW 20 MIN: CPT

## 2025-08-18 PROCEDURE — G2211 COMPLEX E/M VISIT ADD ON: CPT | Mod: NC

## 2025-08-19 LAB — GI PCR PANEL: NOT DETECTED

## 2025-08-21 ENCOUNTER — APPOINTMENT (OUTPATIENT)
Dept: ORTHOPEDIC SURGERY | Facility: CLINIC | Age: 30
End: 2025-08-21

## 2025-08-21 DIAGNOSIS — R22.31 LOCALIZED SWELLING, MASS AND LUMP, RIGHT UPPER LIMB: ICD-10-CM

## 2025-08-21 LAB — CALPROTECTIN FECAL: 5 UG/G

## 2025-08-21 PROCEDURE — 99214 OFFICE O/P EST MOD 30 MIN: CPT

## 2025-08-22 LAB
C DIFF TOXIN B QL PCR REFLEX: NORMAL
GDH ANTIGEN: NOT DETECTED
TOXIN A AND B: NOT DETECTED

## 2025-08-25 ENCOUNTER — APPOINTMENT (OUTPATIENT)
Dept: PSYCHIATRY | Facility: CLINIC | Age: 30
End: 2025-08-25
Payer: SELF-PAY

## 2025-08-25 PROCEDURE — 90837 PSYTX W PT 60 MINUTES: CPT | Mod: 95

## 2025-08-27 ENCOUNTER — NON-APPOINTMENT (OUTPATIENT)
Age: 30
End: 2025-08-27

## 2025-09-03 ENCOUNTER — APPOINTMENT (OUTPATIENT)
Dept: ORTHOPEDIC SURGERY | Facility: CLINIC | Age: 30
End: 2025-09-03

## 2025-09-10 ENCOUNTER — APPOINTMENT (OUTPATIENT)
Dept: ORTHOPEDIC SURGERY | Facility: CLINIC | Age: 30
End: 2025-09-10

## 2025-09-10 ENCOUNTER — APPOINTMENT (OUTPATIENT)
Dept: PSYCHIATRY | Facility: CLINIC | Age: 30
End: 2025-09-10

## 2025-09-10 PROCEDURE — 90837 PSYTX W PT 60 MINUTES: CPT | Mod: 95

## 2025-09-15 ENCOUNTER — APPOINTMENT (OUTPATIENT)
Dept: ORTHOPEDIC SURGERY | Facility: CLINIC | Age: 30
End: 2025-09-15
Payer: COMMERCIAL

## 2025-09-15 ENCOUNTER — NON-APPOINTMENT (OUTPATIENT)
Age: 30
End: 2025-09-15

## 2025-09-15 VITALS — BODY MASS INDEX: 26.77 KG/M2 | WEIGHT: 202 LBS | HEIGHT: 73 IN

## 2025-09-15 DIAGNOSIS — D21.11 BENIGN NEOPLASM OF CONNECTIVE AND OTHER SOFT TISSUE OF RIGHT UPPER LIMB, INCLUDING SHOULDER: ICD-10-CM

## 2025-09-15 PROCEDURE — 99214 OFFICE O/P EST MOD 30 MIN: CPT

## 2025-09-16 ENCOUNTER — APPOINTMENT (OUTPATIENT)
Dept: INTERNAL MEDICINE | Facility: CLINIC | Age: 30
End: 2025-09-16

## 2025-09-17 ENCOUNTER — APPOINTMENT (OUTPATIENT)
Dept: ORTHOPEDIC SURGERY | Facility: CLINIC | Age: 30
End: 2025-09-17
Payer: COMMERCIAL

## 2025-09-17 VITALS — HEIGHT: 73 IN | WEIGHT: 201 LBS | BODY MASS INDEX: 26.64 KG/M2

## 2025-09-17 DIAGNOSIS — M54.6 PAIN IN THORACIC SPINE: ICD-10-CM

## 2025-09-17 PROCEDURE — 99214 OFFICE O/P EST MOD 30 MIN: CPT

## 2025-09-19 ENCOUNTER — APPOINTMENT (OUTPATIENT)
Dept: PULMONOLOGY | Facility: CLINIC | Age: 30
End: 2025-09-19

## 2025-09-19 VITALS
WEIGHT: 200 LBS | OXYGEN SATURATION: 98 % | HEART RATE: 63 BPM | TEMPERATURE: 97.1 F | HEIGHT: 73 IN | BODY MASS INDEX: 26.51 KG/M2 | RESPIRATION RATE: 16 BRPM | SYSTOLIC BLOOD PRESSURE: 120 MMHG | DIASTOLIC BLOOD PRESSURE: 64 MMHG

## 2025-09-19 DIAGNOSIS — R06.02 SHORTNESS OF BREATH: ICD-10-CM

## 2025-09-19 DIAGNOSIS — J82.83 EOSINOPHILIC ASTHMA: ICD-10-CM

## 2025-09-19 DIAGNOSIS — E66.3 OVERWEIGHT: ICD-10-CM

## 2025-09-19 DIAGNOSIS — J30.2 OTHER SEASONAL ALLERGIC RHINITIS: ICD-10-CM

## 2025-09-19 DIAGNOSIS — R76.8 OTHER SPECIFIED ABNORMAL IMMUNOLOGICAL FINDINGS IN SERUM: ICD-10-CM

## 2025-09-19 DIAGNOSIS — J45.909 UNSPECIFIED ASTHMA, UNCOMPLICATED: ICD-10-CM

## 2025-09-19 RX ORDER — LEVALBUTEROL TARTRATE 45 UG/1
45 AEROSOL, METERED ORAL
Qty: 1 | Refills: 2 | Status: ACTIVE | COMMUNITY
Start: 2025-09-19 | End: 1900-01-01

## 2025-09-19 RX ORDER — INHALER, ASSIST DEVICES
SPACER (EA) MISCELLANEOUS
Qty: 1 | Refills: 2 | Status: ACTIVE | COMMUNITY
Start: 2025-09-19 | End: 1900-01-01